# Patient Record
Sex: FEMALE | Race: OTHER | HISPANIC OR LATINO | URBAN - METROPOLITAN AREA
[De-identification: names, ages, dates, MRNs, and addresses within clinical notes are randomized per-mention and may not be internally consistent; named-entity substitution may affect disease eponyms.]

---

## 2020-12-11 ENCOUNTER — INPATIENT (INPATIENT)
Facility: HOSPITAL | Age: 60
LOS: 17 days | Discharge: HOME | End: 2020-12-29
Attending: HOSPITALIST | Admitting: HOSPITALIST
Payer: MEDICAID

## 2020-12-11 VITALS
TEMPERATURE: 99 F | SYSTOLIC BLOOD PRESSURE: 114 MMHG | DIASTOLIC BLOOD PRESSURE: 66 MMHG | OXYGEN SATURATION: 98 % | RESPIRATION RATE: 24 BRPM | HEART RATE: 77 BPM

## 2020-12-11 DIAGNOSIS — E78.5 HYPERLIPIDEMIA, UNSPECIFIED: ICD-10-CM

## 2020-12-11 DIAGNOSIS — U07.1 COVID-19: ICD-10-CM

## 2020-12-11 DIAGNOSIS — Z29.9 ENCOUNTER FOR PROPHYLACTIC MEASURES, UNSPECIFIED: ICD-10-CM

## 2020-12-11 DIAGNOSIS — D69.6 THROMBOCYTOPENIA, UNSPECIFIED: ICD-10-CM

## 2020-12-11 LAB
A1C WITH ESTIMATED AVERAGE GLUCOSE RESULT: 6.3 % — HIGH (ref 4–5.6)
ADD ON TEST-SPECIMEN IN LAB: SIGNIFICANT CHANGE UP
ADD ON TEST-SPECIMEN IN LAB: SIGNIFICANT CHANGE UP
ALBUMIN SERPL ELPH-MCNC: 4 G/DL — SIGNIFICANT CHANGE UP (ref 3.3–5)
ALP SERPL-CCNC: 67 U/L — SIGNIFICANT CHANGE UP (ref 40–120)
ALT FLD-CCNC: 53 U/L — HIGH (ref 4–33)
ANION GAP SERPL CALC-SCNC: 13 MMOL/L — SIGNIFICANT CHANGE UP (ref 7–14)
APTT BLD: 26.9 SEC — LOW (ref 27–36.3)
AST SERPL-CCNC: 84 U/L — HIGH (ref 4–32)
B PERT DNA SPEC QL NAA+PROBE: SIGNIFICANT CHANGE UP
BASE EXCESS BLDV CALC-SCNC: 1.1 MMOL/L — SIGNIFICANT CHANGE UP (ref -3–2)
BASE EXCESS BLDV CALC-SCNC: 2.7 MMOL/L — HIGH (ref -3–2)
BASOPHILS # BLD AUTO: 0.01 K/UL — SIGNIFICANT CHANGE UP (ref 0–0.2)
BASOPHILS NFR BLD AUTO: 0.3 % — SIGNIFICANT CHANGE UP (ref 0–2)
BILIRUB SERPL-MCNC: 0.2 MG/DL — SIGNIFICANT CHANGE UP (ref 0.2–1.2)
BLOOD GAS VENOUS - CREATININE: 1 MG/DL — SIGNIFICANT CHANGE UP (ref 0.5–1.3)
BLOOD GAS VENOUS - CREATININE: 1 MG/DL — SIGNIFICANT CHANGE UP (ref 0.5–1.3)
BLOOD GAS VENOUS COMPREHENSIVE RESULT: SIGNIFICANT CHANGE UP
BLOOD GAS VENOUS COMPREHENSIVE RESULT: SIGNIFICANT CHANGE UP
BUN SERPL-MCNC: 11 MG/DL — SIGNIFICANT CHANGE UP (ref 7–23)
C PNEUM DNA SPEC QL NAA+PROBE: SIGNIFICANT CHANGE UP
CALCIUM SERPL-MCNC: 8.7 MG/DL — SIGNIFICANT CHANGE UP (ref 8.4–10.5)
CHLORIDE BLDV-SCNC: 109 MMOL/L — HIGH (ref 96–108)
CHLORIDE BLDV-SCNC: 110 MMOL/L — HIGH (ref 96–108)
CHLORIDE SERPL-SCNC: 101 MMOL/L — SIGNIFICANT CHANGE UP (ref 98–107)
CO2 SERPL-SCNC: 23 MMOL/L — SIGNIFICANT CHANGE UP (ref 22–31)
CREAT SERPL-MCNC: 0.95 MG/DL — SIGNIFICANT CHANGE UP (ref 0.5–1.3)
CRP SERPL-MCNC: 31.3 MG/L — HIGH
D DIMER BLD IA.RAPID-MCNC: 502 NG/ML DDU — HIGH
EOSINOPHIL # BLD AUTO: 0.06 K/UL — SIGNIFICANT CHANGE UP (ref 0–0.5)
EOSINOPHIL NFR BLD AUTO: 1.6 % — SIGNIFICANT CHANGE UP (ref 0–6)
ESTIMATED AVERAGE GLUCOSE: 134 MG/DL — HIGH (ref 68–114)
FERRITIN SERPL-MCNC: 486 NG/ML — HIGH (ref 15–150)
FLUAV H1 2009 PAND RNA SPEC QL NAA+PROBE: SIGNIFICANT CHANGE UP
FLUAV H1 RNA SPEC QL NAA+PROBE: SIGNIFICANT CHANGE UP
FLUAV H3 RNA SPEC QL NAA+PROBE: SIGNIFICANT CHANGE UP
FLUAV SUBTYP SPEC NAA+PROBE: SIGNIFICANT CHANGE UP
FLUBV RNA SPEC QL NAA+PROBE: SIGNIFICANT CHANGE UP
GAS PNL BLDV: 135 MMOL/L — LOW (ref 136–146)
GAS PNL BLDV: 138 MMOL/L — SIGNIFICANT CHANGE UP (ref 136–146)
GLUCOSE BLDV-MCNC: 112 MG/DL — HIGH (ref 70–99)
GLUCOSE BLDV-MCNC: 90 MG/DL — SIGNIFICANT CHANGE UP (ref 70–99)
GLUCOSE SERPL-MCNC: 113 MG/DL — HIGH (ref 70–99)
HADV DNA SPEC QL NAA+PROBE: SIGNIFICANT CHANGE UP
HCO3 BLDV-SCNC: 24 MMOL/L — SIGNIFICANT CHANGE UP (ref 20–27)
HCO3 BLDV-SCNC: 26 MMOL/L — SIGNIFICANT CHANGE UP (ref 20–27)
HCOV PNL SPEC NAA+PROBE: SIGNIFICANT CHANGE UP
HCT VFR BLD CALC: 38.3 % — SIGNIFICANT CHANGE UP (ref 34.5–45)
HCT VFR BLDA CALC: 36.9 % — SIGNIFICANT CHANGE UP (ref 34.5–46.5)
HCT VFR BLDA CALC: 39.8 % — SIGNIFICANT CHANGE UP (ref 34.5–46.5)
HGB BLD CALC-MCNC: 12 G/DL — SIGNIFICANT CHANGE UP (ref 11.5–15.5)
HGB BLD CALC-MCNC: 12.9 G/DL — SIGNIFICANT CHANGE UP (ref 11.5–15.5)
HGB BLD-MCNC: 12.8 G/DL — SIGNIFICANT CHANGE UP (ref 11.5–15.5)
HMPV RNA SPEC QL NAA+PROBE: SIGNIFICANT CHANGE UP
HPIV1 RNA SPEC QL NAA+PROBE: SIGNIFICANT CHANGE UP
HPIV2 RNA SPEC QL NAA+PROBE: SIGNIFICANT CHANGE UP
HPIV3 RNA SPEC QL NAA+PROBE: SIGNIFICANT CHANGE UP
HPIV4 RNA SPEC QL NAA+PROBE: SIGNIFICANT CHANGE UP
IANC: 2.16 K/UL — SIGNIFICANT CHANGE UP (ref 1.5–8.5)
IMM GRANULOCYTES NFR BLD AUTO: 1.1 % — SIGNIFICANT CHANGE UP (ref 0–1.5)
INR BLD: 1.13 RATIO — SIGNIFICANT CHANGE UP (ref 0.88–1.17)
LACTATE BLDV-MCNC: 1.3 MMOL/L — SIGNIFICANT CHANGE UP (ref 0.5–2)
LACTATE BLDV-MCNC: 2.6 MMOL/L — HIGH (ref 0.5–2)
LDH SERPL L TO P-CCNC: 547 U/L — HIGH (ref 135–225)
LYMPHOCYTES # BLD AUTO: 1.22 K/UL — SIGNIFICANT CHANGE UP (ref 1–3.3)
LYMPHOCYTES # BLD AUTO: 32.4 % — SIGNIFICANT CHANGE UP (ref 13–44)
MCHC RBC-ENTMCNC: 28.2 PG — SIGNIFICANT CHANGE UP (ref 27–34)
MCHC RBC-ENTMCNC: 33.4 GM/DL — SIGNIFICANT CHANGE UP (ref 32–36)
MCV RBC AUTO: 84.4 FL — SIGNIFICANT CHANGE UP (ref 80–100)
MONOCYTES # BLD AUTO: 0.28 K/UL — SIGNIFICANT CHANGE UP (ref 0–0.9)
MONOCYTES NFR BLD AUTO: 7.4 % — SIGNIFICANT CHANGE UP (ref 2–14)
NEUTROPHILS # BLD AUTO: 2.16 K/UL — SIGNIFICANT CHANGE UP (ref 1.8–7.4)
NEUTROPHILS NFR BLD AUTO: 57.2 % — SIGNIFICANT CHANGE UP (ref 43–77)
NRBC # BLD: 0 /100 WBCS — SIGNIFICANT CHANGE UP
NRBC # FLD: 0 K/UL — SIGNIFICANT CHANGE UP
PCO2 BLDV: 38 MMHG — LOW (ref 41–51)
PCO2 BLDV: 42 MMHG — SIGNIFICANT CHANGE UP (ref 41–51)
PH BLDV: 7.4 — SIGNIFICANT CHANGE UP (ref 7.32–7.43)
PH BLDV: 7.46 — HIGH (ref 7.32–7.43)
PLATELET # BLD AUTO: 121 K/UL — LOW (ref 150–400)
PO2 BLDV: 28 MMHG — LOW (ref 35–40)
PO2 BLDV: 33 MMHG — LOW (ref 35–40)
POTASSIUM BLDV-SCNC: 4.1 MMOL/L — SIGNIFICANT CHANGE UP (ref 3.4–4.5)
POTASSIUM BLDV-SCNC: 4.1 MMOL/L — SIGNIFICANT CHANGE UP (ref 3.4–4.5)
POTASSIUM SERPL-MCNC: 4.2 MMOL/L — SIGNIFICANT CHANGE UP (ref 3.5–5.3)
POTASSIUM SERPL-SCNC: 4.2 MMOL/L — SIGNIFICANT CHANGE UP (ref 3.5–5.3)
PROCALCITONIN SERPL-MCNC: 0.08 NG/ML — SIGNIFICANT CHANGE UP (ref 0.02–0.1)
PROT SERPL-MCNC: 7.6 G/DL — SIGNIFICANT CHANGE UP (ref 6–8.3)
PROTHROM AB SERPL-ACNC: 12.9 SEC — SIGNIFICANT CHANGE UP (ref 9.8–13.1)
RAPID RVP RESULT: DETECTED
RBC # BLD: 4.54 M/UL — SIGNIFICANT CHANGE UP (ref 3.8–5.2)
RBC # FLD: 13.8 % — SIGNIFICANT CHANGE UP (ref 10.3–14.5)
RV+EV RNA SPEC QL NAA+PROBE: SIGNIFICANT CHANGE UP
SAO2 % BLDV: 38.9 % — LOW (ref 60–85)
SAO2 % BLDV: 60.9 % — SIGNIFICANT CHANGE UP (ref 60–85)
SARS-COV-2 RNA SPEC QL NAA+PROBE: DETECTED
SODIUM SERPL-SCNC: 137 MMOL/L — SIGNIFICANT CHANGE UP (ref 135–145)
WBC # BLD: 3.77 K/UL — LOW (ref 3.8–10.5)
WBC # FLD AUTO: 3.77 K/UL — LOW (ref 3.8–10.5)

## 2020-12-11 PROCEDURE — 99223 1ST HOSP IP/OBS HIGH 75: CPT

## 2020-12-11 PROCEDURE — 99285 EMERGENCY DEPT VISIT HI MDM: CPT

## 2020-12-11 PROCEDURE — 71045 X-RAY EXAM CHEST 1 VIEW: CPT | Mod: 26

## 2020-12-11 RX ORDER — ALBUTEROL 90 UG/1
2 AEROSOL, METERED ORAL ONCE
Refills: 0 | Status: COMPLETED | OUTPATIENT
Start: 2020-12-11 | End: 2020-12-11

## 2020-12-11 RX ORDER — ACETAMINOPHEN 500 MG
650 TABLET ORAL EVERY 4 HOURS
Refills: 0 | Status: DISCONTINUED | OUTPATIENT
Start: 2020-12-11 | End: 2020-12-14

## 2020-12-11 RX ORDER — ALBUTEROL 90 UG/1
2 AEROSOL, METERED ORAL EVERY 6 HOURS
Refills: 0 | Status: COMPLETED | OUTPATIENT
Start: 2020-12-11 | End: 2020-12-13

## 2020-12-11 RX ORDER — SODIUM CHLORIDE 9 MG/ML
1000 INJECTION INTRAMUSCULAR; INTRAVENOUS; SUBCUTANEOUS ONCE
Refills: 0 | Status: COMPLETED | OUTPATIENT
Start: 2020-12-11 | End: 2020-12-11

## 2020-12-11 RX ORDER — DEXAMETHASONE 0.5 MG/5ML
6 ELIXIR ORAL ONCE
Refills: 0 | Status: COMPLETED | OUTPATIENT
Start: 2020-12-11 | End: 2020-12-11

## 2020-12-11 RX ORDER — ENOXAPARIN SODIUM 100 MG/ML
40 INJECTION SUBCUTANEOUS AT BEDTIME
Refills: 0 | Status: DISCONTINUED | OUTPATIENT
Start: 2020-12-11 | End: 2020-12-17

## 2020-12-11 RX ADMIN — ALBUTEROL 2 PUFF(S): 90 AEROSOL, METERED ORAL at 11:19

## 2020-12-11 RX ADMIN — SODIUM CHLORIDE 1000 MILLILITER(S): 9 INJECTION INTRAMUSCULAR; INTRAVENOUS; SUBCUTANEOUS at 11:19

## 2020-12-11 RX ADMIN — Medication 100 MILLIGRAM(S): at 11:18

## 2020-12-11 RX ADMIN — Medication 100 MILLIGRAM(S): at 21:31

## 2020-12-11 RX ADMIN — ALBUTEROL 2 PUFF(S): 90 AEROSOL, METERED ORAL at 21:31

## 2020-12-11 RX ADMIN — SODIUM CHLORIDE 1000 MILLILITER(S): 9 INJECTION INTRAMUSCULAR; INTRAVENOUS; SUBCUTANEOUS at 14:11

## 2020-12-11 RX ADMIN — Medication 6 MILLIGRAM(S): at 13:15

## 2020-12-11 RX ADMIN — Medication 100 MILLIGRAM(S): at 16:12

## 2020-12-11 RX ADMIN — ENOXAPARIN SODIUM 40 MILLIGRAM(S): 100 INJECTION SUBCUTANEOUS at 21:42

## 2020-12-11 RX ADMIN — ALBUTEROL 2 PUFF(S): 90 AEROSOL, METERED ORAL at 16:45

## 2020-12-11 NOTE — DISCHARGE NOTE PROVIDER - HOSPITAL COURSE
61 yo obese F with HLD presents with new shortness of breath in setting of recent COVID diagnosis as OP. Patient states that hse has been going to [employer], last 12/1/20, and many of the congregates do not wear masks the entire service, states started feeling ill 12/3/20.  Started with cough/chills/general maliase and weakness.  Had OP COVID testing on 12/7,  whom she lives with also positive, reports she believes she infected him; no recent travel, does not work out side the home,  Starting on 12/10 started having hacking cough that was causing worsening sob; reports urgent care prescribed 4 day of an abx but she doesn't recalll which.  Has also diarrhea usually after eating, mostly taking liquids, also some nausea but no vomiting; no CP.  No prior lung disease, never smoker.      In ED AVSS with RA sat wnl however with tachypnea and labs normal for mild transaminitis (84/53) and thrombocytopenia (121), d-dimer 503, pH 7.46/38 LA 2.6.   CXR with hazy in vague increased markings and wispy hazy ground-glass opacities suggested in peripheral left midlung and peripheral right lower lung suspicious for evolving multifocal infectious      While admitted was oxygen was monitored. Received single dose of Decadron but did not require further steroids.      Patient remained stable and on .... case was discussed with  .... and patient was cleared for discharge. Reviewed discharge medications with patient; All new medications requiring new prescription sent to pharmacy of patients choice. Reviewed need for prescription for previous home medicaitons and new prescriptions sent if requested. Patient in agreement and understands.   61 yo obese F with HLD presents with new shortness of breath in setting of recent COVID diagnosis as OP. Patient states that hse has been going to [employer], last 12/1/20, and many of the congregates do not wear masks the entire service, states started feeling ill 12/3/20.  Started with cough/chills/general maliase and weakness.  Had OP COVID testing on 12/7,  whom she lives with also positive, reports she believes she infected him; no recent travel, does not work out side the home,  Starting on 12/10 started having hacking cough that was causing worsening sob; reports urgent care prescribed 4 day of an abx but she doesn't recalll which.  Has also diarrhea usually after eating, mostly taking liquids, also some nausea but no vomiting; no CP.  No prior lung disease, never smoker.      In ED AVSS with RA sat wnl however with tachypnea and labs normal for mild transaminitis (84/53) and thrombocytopenia (121), d-dimer 503, pH 7.46/38 LA 2.6.   CXR with hazy in vague increased markings and wispy hazy ground-glass opacities suggested in peripheral left midlung and peripheral right lower lung suspicious for evolving multifocal infectious      While admitted was oxygen was monitored and supplemental oxygen was given as needed via nasal cannula. Received  Remdesivir 12/14-12/18, Decadron 6mg12/14-12/23.     It has been determined that the patient no longer needs hospitalization and can recover while remaining in self-quarantine at home.  Patient should follow the prevention steps below until a healthcare provider or local or state health department says to can return to normal activities.  Patients with confirmed COVID-19 should remain under home isolation precautions for 14 days since the positive COVID-19 test and until the risk of secondary transmission to others is thought to be low. The decision to discontinue home isolation precautions should be made on a case-by-case basis, in consultation with healthcare providers and state and local health departments. [place] Department of [facility] can be reached at [phone] for further information about COVID-19.      Patient remained stable and on .... case was discussed with Dr. Pastrana... and patient was cleared for discharge. Reviewed discharge medications with patient; All new medications requiring new prescription sent to pharmacy of patients choice. Reviewed need for prescription for previous home medicaitons and new prescriptions sent if requested. Patient in agreement and understands.    59yo obese F with HLD presents with new shortness of breath in setting of recent COVID diagnosis as OP. Patient states that hse has been going to [employer], last 12/1/20, and many of the congregates do not wear masks the entire service, states started feeling ill 12/3/20.  Started with cough/chills/general maliase and weakness.  Had OP COVID testing on 12/7,  whom she lives with also positive, reports she believes she infected him; no recent travel, does not work out side the home,  Starting on 12/10 started having hacking cough that was causing worsening sob; reports urgent care prescribed 4 day of an abx but she doesn't recalll which.  Has also diarrhea usually after eating, mostly taking liquids, also some nausea but no vomiting; no CP.  No prior lung disease, never smoker.      In ED AVSS with RA sat wnl however with tachypnea and labs normal for mild transaminitis (84/53) and thrombocytopenia (121), d-dimer 503, pH 7.46/38 LA 2.6.   CXR with hazy in vague increased markings and wispy hazy ground-glass opacities suggested in peripheral left midlung and peripheral right lower lung suspicious for evolving multifocal infectious      While admitted was oxygen was monitored and supplemental oxygen was given as needed via nasal cannula. Received  Remdesivir 12/14-12/18, Decadron 6mg12/14-12/23.     It has been determined that the patient no longer needs hospitalization and can recover while remaining in self-quarantine at home.  Patient should follow the prevention steps below until a healthcare provider or local or state health department says to can return to normal activities.  Patients with confirmed COVID-19 should remain under home isolation precautions for 14 days since the positive COVID-19 test and until the risk of secondary transmission to others is thought to be low. The decision to discontinue home isolation precautions should be made on a case-by-case basis, in consultation with healthcare providers and state and local health departments. [place] Department [facility] can be reached at [phone] for further information about COVID-19.      Patient remained stable and on .... case was discussed with Dr. Pastrana... and patient was cleared for discharge. Reviewed discharge medications with patient; All new medications requiring new prescription sent to pharmacy of patients choice. Reviewed need for prescription for previous home medicaitons and new prescriptions sent if requested. Patient in agreement and understands.    61yo obese F with HLD presents with new shortness of breath in setting of recent COVID diagnosis as OP. Patient states that hse has been going to [employer], last 12/1/20, and many of the congregates do not wear masks the entire service, states started feeling ill 12/3/20.  Started with cough/chills/general maliase and weakness.  Had OP COVID testing on 12/7,  whom she lives with also positive, reports she believes she infected him; no recent travel, does not work out side the home,  Starting on 12/10 started having hacking cough that was causing worsening sob; reports urgent care prescribed 4 day of an abx but she doesn't recalll which.  Has also diarrhea usually after eating, mostly taking liquids, also some nausea but no vomiting; no CP.  No prior lung disease, never smoker.      In ED AVSS with RA sat wnl however with tachypnea and labs normal for mild transaminitis (84/53) and thrombocytopenia (121), d-dimer 503, pH 7.46/38 LA 2.6.   CXR with hazy in vague increased markings and wispy hazy ground-glass opacities suggested in peripheral left midlung and peripheral right lower lung suspicious for evolving multifocal infectious      While admitted was oxygen was monitored and supplemental oxygen was given as needed via nasal cannula. Received  Remdesivir 12/14-12/18, Decadron 6mg12/14-12/23.     It has been determined that the patient no longer needs hospitalization and can recover while remaining in self-quarantine at home.  Patient should follow the prevention steps below until a healthcare provider or local or state health department says to can return to normal activities.  Patients with confirmed COVID-19 should remain under home isolation precautions for 14 days since the positive COVID-19 test and until the risk of secondary transmission to others is thought to be low. The decision to discontinue home isolation precautions should be made on a case-by-case basis, in consultation with healthcare providers and state and local health departments. [place] Department [facility] can be reached at [phone] for further information about COVID-19.      Patient remained stable and on 12/29 case was discussed with Dr. Melara  and patient was cleared for discharge. Reviewed discharge medications with patient; All new medications requiring new prescription sent to pharmacy of patients choice. Reviewed need for prescription for previous home medicaitons and new prescriptions sent if requested. Patient in agreement and understands.    59yo obese F with HLD presents with new shortness of breath in setting of recent COVID diagnosis as OP. Patient states that hse has been going to [employer], last 12/1/20, and many of the congregates do not wear masks the entire service, states started feeling ill 12/3/20.  Started with cough/chills/general maliase and weakness.  Had OP COVID testing on 12/7,  whom she lives with also positive, reports she believes she infected him; no recent travel, does not work out side the home,  Starting on 12/10 started having hacking cough that was causing worsening sob; reports urgent care prescribed 4 day of an abx but she doesn't recalll which.  Has also diarrhea usually after eating, mostly taking liquids, also some nausea but no vomiting; no CP.  No prior lung disease, never smoker.      In ED AVSS with RA sat wnl however with tachypnea and labs normal for mild transaminitis (84/53) and thrombocytopenia (121), d-dimer 503, pH 7.46/38 LA 2.6.   CXR with hazy in vague increased markings and wispy hazy ground-glass opacities suggested in peripheral left midlung and peripheral right lower lung suspicious for evolving multifocal infectious      While admitted was oxygen was monitored and supplemental oxygen was given as needed via nasal cannula. Received  Remdesivir 12/14-12/18, Decadron 6mg12/14-12/23.     It has been determined that the patient no longer needs hospitalization and can recover while remaining in self-quarantine at home.  Patient should follow the prevention steps below until a healthcare provider or local or state health department says to can return to normal activities.  Patients with confirmed COVID-19 should remain under home isolation precautions for 14 days since the positive COVID-19 test and until the risk of secondary transmission to others is thought to be low. The decision to discontinue home isolation precautions should be made on a case-by-case basis, in consultation with healthcare providers and state and local health departments. [place] Department [facility] can be reached at [phone] for further information about COVID-19.      Patient remained stable and on 12/29 case was discussed with Dr. Melara  and patient was cleared for discharge. Reviewed discharge medications with patient; All new medications requiring new prescription sent to pharmacy of patients choice. Reviewed need for prescription for previous home medicaitons and new prescriptions sent if requested. Patient in agreement and understands.      59 yo woman w/ Hx obesity, HLD admitted for monitoring of respiratory status in the setting of COVID PNA. Pt symptomatic but remains with adequate oxygenation on room air. Noted positive blood cx in 1 of 2 sets s/p dose of Vancomycin but likely contaminant since noted to be coag negative staph. D/C 40 minutes.      Problem/Plan - 1:  ·  Problem: Orthostatic hypotension.  Plan: - Orthostatic reading improving  -patient denies dizzines or HA today.  - on Midodrine 5mg TID x 2 weeks  - Meclizine PRN dizziness.   -TTE WNL.      Problem/Plan - 2:  ·  Problem: Chest pain.  Plan: - 12/25: Reports 1 episode of chest pain overnight, midsternal, nonradiating.   - Currently denies any chest pain or shortness of breath, nausea/vomiting.   - Admits to feeling intermittently anxious/overwhelmed due to Covid.   - RULED OUT ACS, symptoms possibly anxiety driven.   - 12/25: TROPONIN negative, EKG with no acute findings, no ST changes.   - TTE WNL.      Problem/Plan - 3:  ·  Problem: COVID-19 virus infection.  Plan: - COVID PCR + on 12/7 (as OP), 12/11 ([facility]). CXR w hazy opacities.  - Pt saturating 94-98% on room air. During ambulation 96%.  - Completed Remdesivir day 5/5 and Dexamethasone day 9/10   - Monitor inflammatory markers, LFTs and renal function closely, trending down   - c/w supportive care with cough suppressants and Albuterol MDIs.  - PRN Tylenol for body aches and headache.  - Incentive spirometry, Chest PT as tolerated.      Problem/Plan - 4:  ·  Problem: Positive blood culture.  Plan: - Blood cultures: 1 out of 2 sets noted to have Staph hominis. Suspect contaminant.   - Pt remains hemodynamically stable and with normal WBC.   - Repeat blood cx NGTD. Defer further abx for now, ID f/u appreciated.      Problem/Plan - 5:  ·  Problem: Hyperlipidemia, unspecified hyperlipidemia type.  Plan: - Not on meds, outpatient f/u.      Problem/Plan - 6:  Problem: Thrombocytopenia. Plan: - Likely in setting of COVID infection on admission. Spontaneously resolved.     Problem/Plan - 7:  ·  Problem: Prophylactic measure.  Plan: - IMPROVE Score 3 - on Lovenox SQ for DVT prevention.   - D-dimer was worsening therefore increased Lovenox 40mg SQ to BID  - Will need extended DVT ppx on DC ASA 81mg  ( x30 days).   - Dispo PENDING COURSE, Weaned to room air, O2 sats stable during ambulation.  spoke to  Lokesh Orourke and aware of discharge planning.      Discharge Medication reconciliation and follow up reviewed with Medical Attending and confirmed before discharge on 12/29

## 2020-12-11 NOTE — H&P ADULT - PROBLEM SELECTOR PLAN 4
IMPROVE Score 3 - Lovenox for DVT prevention IMPROVE Score 3 - Lovenox for DVT prevention, pending BMI calculation may need to change to BID dosing

## 2020-12-11 NOTE — H&P ADULT - NSHPREVIEWOFSYSTEMS_GEN_ALL_CORE
CONSTITUTIONAL: + fever/chills, + fatigue   EYES: No eye pain, visual disturbances, or discharge  ENMT:  No difficulty hearing, tinnitus  NECK: No pain or stiffness  BREASTS: No pain, masses, or nipple discharge  CARDIOVASCULAR: No chest pain, palpitations, dizziness, or leg swelling  GASTROINTESTINAL: no abdominal pain, +diarrhea, + nausea   GENITOURINARY: No dysuria, frequency, hematuria, or incontinence  NEUROLOGICAL: No headaches, memory loss, loss of strength, numbness, or tremors  SKIN: No itching, burning, rashes, or lesions   LYMPH NODES: No enlarged glands  ENDOCRINE: No heat or cold intolerance; No hair loss  MUSCULOSKELETAL: No muscle or back pain  PSYCHIATRIC: No depression, anxiety, mood swings, or difficulty sleeping  HEME/LYMPH: No easy bruising, or bleeding gums  ALLERGY AND IMMUNOLOGIC: No hives or eczema

## 2020-12-11 NOTE — ED PROVIDER NOTE - NS ED ROS FT
GENERAL: fever  EYES: no change in vision  HEENT: no trouble swallowing or speaking  CARDIAC: no chest pain or palpitations  PULMONARY: sob, cough  GI: diarrhea, no abd pain, nausea, vomiting   : No changes in urination  SKIN: no rashes  NEURO: no headache, numbness, or weakness  MSK: No joint pain

## 2020-12-11 NOTE — DISCHARGE NOTE PROVIDER - CARE PROVIDER_API CALL
PCP,   Phone: (   )    -  Fax: (   )    -  Follow Up Time:    Neri Rousseau  Phone: [phone]  Fax: (   )    -  Follow Up Time: 1 week

## 2020-12-11 NOTE — DISCHARGE NOTE PROVIDER - NSDCFUADDAPPT_GEN_ALL_CORE_FT
Follow up with your primary care physician for further monitoring in 1-2 weeks. Please call to arrange appointment.

## 2020-12-11 NOTE — H&P ADULT - NSHPPHYSICALEXAM_GEN_ALL_CORE
T(C): 37.2 (12-11-20 @ 10:19), Max: 37.2 (12-11-20 @ 10:19)  HR: 85 (12-11-20 @ 13:16) (77 - 86)  BP: 105/68 (12-11-20 @ 13:16) (105/68 - 114/66)  RR: 26 (12-11-20 @ 13:16) (24 - 30)  SpO2: 100% (12-11-20 @ 13:16) (98% - 100%)    PHYSICAL EXAM:  GENERAL: NAD, obese, sat on RA was 96%  HEAD:  Atraumatic, Normocephalic  CHEST/LUNG: Clear to auscultation bilaterally; no wheeze  HEART: Regular rate and rhythm; no murmurs, rubs, or gallops  ABDOMEN: Soft, Nontender, Nondistended; Bowel sounds present  EXTREMITIES:  warm and well perfused, no clubbing, cyanosis, or edema  PSYCH: AAOx3  NEUROLOGY: non-focal  SKIN: no rashes or lesions

## 2020-12-11 NOTE — ED PROVIDER NOTE - PHYSICAL EXAMINATION
GEN: coughing, appears uncomfortable   HEENT: NCAT, MMM, normal conjunctiva, perrl  CHEST/LUNGS: tachypneic, b/l expiratory wheeze   CARDIAC: Non-tachycardic, s1s2, normal perfusion, no peripheral edema  ABDOMEN: Soft, NTND, No rebound/guarding  MSK: No joint tenderness, no gross deformity of extremities  SKIN: No rashes, no petechiae, no vesicles  NEURO: CN grossly intact, normal coordination, no focal motor or sensory deficits  PSYCH: Alert, appropriate, cooperative

## 2020-12-11 NOTE — DISCHARGE NOTE PROVIDER - NSDCCPCAREPLAN_GEN_ALL_CORE_FT
PRINCIPAL DISCHARGE DIAGNOSIS  Diagnosis: COVID-19 virus infection  Assessment and Plan of Treatment: You have been diagnosed with the COVID-19 virus during your hospital stay. You must self quarantine to complete a 14 day time period and until you no longer have fever or symptoms for 3 consecutive days.  Monitor your temperature daily to not any changes and increases.  Please remain in self quaratine while you continue to recover at home.  For questions regarding COVID-19 the  [place] [facility] of [facility] can be reached at [phone] for further information about COVID-19.   1. You should restrict activities outside your home, except for getting medical care.  2. Do not go to work, school, or public areas.  3. Avoid using public transportation, ride-sharing, or taxis.  4. Separate yourself from other people and animals in your home; Use a seperate bathroom if possible.   5. Call ahead before visiting your doctor and notify the staff that you had or may have COVID 19.   6. Wear a facemask when you are around other people (e.g., sharing a room or vehicle) or pets and before you enter a healthcare provider’s office. If you are unable to wear a mask then the people around you should wear one.   8. Clean your hands often.  Soap and water are the best option if hands are visibly dirty. Avoid touching your eyes, nose, and mouth with unwashed hands.  9. Avoid sharing personal household items.  10. Clean all “high-touch” surfaces everyday to help limit spread of the virus  11. Monitor your symptoms and Seek prompt medical attention if your illness is worsening. If you have a medical emergency and need to call 911, notify the dispatch personnel.       PRINCIPAL DISCHARGE DIAGNOSIS  Diagnosis: COVID-19 virus infection  Assessment and Plan of Treatment: You have been diagnosed with the COVID-19 virus during your hospital stay. You must self quarantine to complete a 14 day time period and until you no longer have fever or symptoms for 3 consecutive days.  Monitor your temperature daily to not any changes and increases.  Please remain in self quaratine while you continue to recover at home.  For questions regarding COVID-19 the  [place] [facility] of [facility] can be reached at [phone] for further information about COVID-19.   1. You should restrict activities outside your home, except for getting medical care.  2. Do not go to work, school, or public areas.  3. Avoid using public transportation, ride-sharing, or taxis.  4. Separate yourself from other people and animals in your home; Use a seperate bathroom if possible.   5. Call ahead before visiting your doctor and notify the staff that you had or may have COVID 19.   6. Wear a facemask when you are around other people (e.g., sharing a room or vehicle) or pets and before you enter a healthcare provider’s office. If you are unable to wear a mask then the people around you should wear one.   8. Clean your hands often.  Soap and water are the best option if hands are visibly dirty. Avoid touching your eyes, nose, and mouth with unwashed hands.  9. Avoid sharing personal household items.  10. Clean all “high-touch” surfaces everyday to help limit spread of the virus  11. Monitor your symptoms and Seek prompt medical attention if your illness is worsening. If you have a medical emergency and need to call 911, notify the dispatch personnel.      SECONDARY DISCHARGE DIAGNOSES  Diagnosis: Orthostatic hypotension  Assessment and Plan of Treatment: Orthostatic hypotension trial of midodrine and encourage fluids  by mouth  follow up with primary care doctor within 1 week for follow up    Diagnosis: Chest pain  Assessment and Plan of Treatment: Chest pain likely secondary to cough, cardiology workup ??? ; TTE----------------------------------*****    Diagnosis: Transaminitis  Assessment and Plan of Treatment: Transaminitis Liver function tests at discharge:   Follow up with your primary care doctor within 1 week for repeat blood work    Diagnosis: Thrombocytopenia  Assessment and Plan of Treatment: Thrombocytopenia  Platelet count at discharge:   Follow up with your primary care doctor within 1 week for repeat blood work    Diagnosis: Hyperlipidemia, unspecified hyperlipidemia type  Assessment and Plan of Treatment: Hyperlipidemia, unspecified hyperlipidemia type Continue prescribed medications to control your cholesterol levels and a DASH (Low fat/salt) diet. Follow up with your primary care provider upon discharge for further management and monitoring of cholesterol levels. exercise daily and continue current medications. Follow up with primary care physician and cardiologist for management.     PRINCIPAL DISCHARGE DIAGNOSIS  Diagnosis: COVID-19 virus infection  Assessment and Plan of Treatment: You have been diagnosed with the COVID-19 virus during your hospital stay. You must self quarantine to complete a 14 day time period and until you no longer have fever or symptoms for 3 consecutive days.  Monitor your temperature daily to not any changes and increases.  Please remain in self quaratine while you continue to recover at home.  For questions regarding COVID-19 the  [place] [facility] can be reached at [phone] for further information about COVID-19.   1. You should restrict activities outside your home, except for getting medical care.  2. Do not go to work, school, or public areas.  3. Avoid using public transportation, ride-sharing, or taxis.  4. Separate yourself from other people and animals in your home; Use a seperate bathroom if possible.   5. Call ahead before visiting your doctor and notify the staff that you had or may have COVID 19.   6. Wear a facemask when you are around other people (e.g., sharing a room or vehicle) or pets and before you enter a healthcare provider’s office. If you are unable to wear a mask then the people around you should wear one.   8. Clean your hands often.  Soap and water are the best option if hands are visibly dirty. Avoid touching your eyes, nose, and mouth with unwashed hands.  9. Avoid sharing personal household items.  10. Clean all “high-touch” surfaces everyday to help limit spread of the virus  11. Monitor your symptoms and Seek prompt medical attention if your illness is worsening. If you have a medical emergency and need to call 911, notify the dispatch personnel.      SECONDARY DISCHARGE DIAGNOSES  Diagnosis: Orthostatic hypotension  Assessment and Plan of Treatment: Orthostatic hypotension trial of midodrine and encourage fluids  by mouth  follow up with primary care doctor within 1 week for follow up    Diagnosis: Chest pain  Assessment and Plan of Treatment: Chest pain likely secondary to cough, TTE normal    Diagnosis: Transaminitis  Assessment and Plan of Treatment: Transaminitis resolved follow up with primary care doctor routinely    Diagnosis: Thrombocytopenia  Assessment and Plan of Treatment: Thrombocytopenia likely 2/2 medications   Follow up with your primary care doctor within 1 week for repeat blood work    Diagnosis: Hyperlipidemia, unspecified hyperlipidemia type  Assessment and Plan of Treatment: Hyperlipidemia, unspecified hyperlipidemia type Continue prescribed medications to control your cholesterol levels and a DASH (Low fat/salt) diet. Follow up with your primary care provider upon discharge for further management and monitoring of cholesterol levels. exercise daily and continue current medications. Follow up with primary care physician and cardiologist for management.

## 2020-12-11 NOTE — DISCHARGE NOTE PROVIDER - PROVIDER TOKENS
FREE:[LAST:[PCP],PHONE:[(   )    -],FAX:[(   )    -]] FREE:[LAST:[Dr Butt],FIRST:[Neri],PHONE:[phone]],FAX:[(   )    -],FOLLOWUP:[1 week]]

## 2020-12-11 NOTE — DISCHARGE NOTE PROVIDER - NSDCMRMEDTOKEN_GEN_ALL_CORE_FT
acetaminophen 325 mg oral tablet: 2 tab(s) orally every 6 hours, As needed, Temp greater or equal to 38C (100.4F), Mild Pain (1 - 3), Moderate Pain (4 - 6)  albuterol 90 mcg/inh inhalation aerosol: 2 puff(s) inhaled every 6 hours, As needed, Shortness of Breath and/or Wheezing  guaifenesin-dextromethorphan 100 mg-10 mg/5 mL oral liquid: 10 milliliter(s) orally every 4 hours, As needed, Cough   acetaminophen 325 mg oral tablet: 2 tab(s) orally every 6 hours, As needed, Temp greater or equal to 38C (100.4F), Mild Pain (1 - 3), Moderate Pain (4 - 6)  albuterol 90 mcg/inh inhalation aerosol: 2 puff(s) inhaled every 6 hours, As needed, Shortness of Breath and/or Wheezing  guaifenesin-dextromethorphan 100 mg-10 mg/5 mL oral liquid: 10 milliliter(s) orally every 4 hours, As needed, Cough  melatonin 3 mg oral tablet: 1 tab(s) orally once a day (at bedtime), As needed, Insomnia  midodrine 5 mg oral tablet: 1 tab(s) orally 3 times a day  senna oral tablet: 2 tab(s) orally once a day (at bedtime)   acetaminophen 325 mg oral tablet: 2 tab(s) orally every 6 hours, As needed, Temp greater or equal to 38C (100.4F), Mild Pain (1 - 3), Moderate Pain (4 - 6)  albuterol 90 mcg/inh inhalation aerosol: 2 puff(s) inhaled every 6 hours, As needed, Shortness of Breath and/or Wheezing  guaifenesin-dextromethorphan 100 mg-10 mg/5 mL oral liquid: 10 milliliter(s) orally every 4 hours, As needed, Cough  melatonin 3 mg oral tablet: 1 tab(s) orally once a day (at bedtime), As needed, Insomnia  midodrine 5 mg oral tablet: 1 tab(s) orally 3 times a day  senna oral tablet: 2 tab(s) orally once a day (at bedtime)  Xarelto 10 mg oral tablet: 1 tab(s) orally once a day post discharge dvt ppx for 30 days

## 2020-12-11 NOTE — H&P ADULT - NSHPLABSRESULTS_GEN_ALL_CORE
12.8   3.77  )-----------( 121      ( 11 Dec 2020 11:50 )             38.3     12-11    137  |  101  |  11  ----------------------------<  113<H>  4.2   |  23  |  0.95    Ca    8.7      11 Dec 2020 11:50    TPro  7.6  /  Alb  4.0  /  TBili  0.2  /  DBili  x   /  AST  84<H>  /  ALT  53<H>  /  AlkPhos  67  12-11    D-Dimer 502  CRP 31.3  FErritin 482  Procalcitonin 0.08  Lactate 2.6 12.8   3.77  )-----------( 121      ( 11 Dec 2020 11:50 )             38.3     12-11    137  |  101  |  11  ----------------------------<  113<H>  4.2   |  23  |  0.95    Ca    8.7      11 Dec 2020 11:50    TPro  7.6  /  Alb  4.0  /  TBili  0.2  /  DBili  x   /  AST  84<H>  /  ALT  53<H>  /  AlkPhos  67  12-11    D-Dimer 502  CRP 31.3  Ferritin 482  Procalcitonin 0.08  Lactate 2.6    CXR personally reviewed   Vague increased markings and wispy hazy ground-glass opacities suggested in peripheral left midlung and peripheral right lower lung suspicious for evolving multifocal infectious process including from potential atypical viral etiologies, follow-up suggested otherwise chest CT would be helpful for more definitive evaluation. No pleural effusions or pneumothorax.  Heart size and mediastinal width inaccurately assessed on the projection.  Trachea midline. Unremarkable osseous structures.

## 2020-12-11 NOTE — ED ADULT NURSE NOTE - OBJECTIVE STATEMENT
pt with viral s/s since 12/3 was dx with COVID on 12/7 pt reports cough has been getting worse with SOB today. upon assessment pt has dry cough, tachypnea with fine wheezing. pt normoxic on room air palced on 2 lpm 02 via NC for comfort. denies chest pain but does report 2-3 days of diarrhea, limited PO intake however is able to drink water. Medicated as ordered, IV 20 g L forearm. labs sent pulse ox in place. kept on AB precautions

## 2020-12-11 NOTE — ED PROVIDER NOTE - OBJECTIVE STATEMENT
60F with no significant pmh presenting with dry cough, sob, diarrhea x1 week with +COVID test outpatient 12/7. Sick contact with spouse who is also COVID+. States coughing incessant, persistent and worsening causing her to feel short of breath. Denies chest pain. Endorses intermittent high fevers with last fever Tmax 103 yesterday with no fever today. Denies chills, cp, nausea, vomiting, dysuria

## 2020-12-11 NOTE — H&P ADULT - HISTORY OF PRESENT ILLNESS
60 y.o female pmh HLD presents with shortness of breath. Patient states that hse has been going to [employer] and many of the congregates do not wear masks. Last service was  12/1. She states for last week has been having cough, sob and diarrhea with general malaise. On 12/7 she was tested and found to have COVID as an outpatient. She took several days of azithromycin as prescribed without improvement. Today was having worsening cough which caused her shortness to get worse. Denies chest pain, abd pain, 59 yo obese F with HLD presents with shortness of breath. Patient states that hse has been going to [employer], last 12/1/20, and many of the congregates do not wear masks the entire service, states started feeling ill 12/3/20.  Started with cough/chills/general maliase and weakness.  Had OP COVID testing on 12/7,  whom she lives with also positive, reports she believes she infected him; no recent travel, does not work out side the home,  Starting on 12/10 started having hacking cough that was causing worsening sob; reports urgent care prescribed 4 day of an abx but she doesn't recalll which.  Has also diarrhea usually after eating, mostly taking liquids, also some nausea but no vomiting; no CP.  No prior lung disease, never smoker.      In ED AVSS with RA sat wnl however with tachypnea and labs normal for mild transaminitis (84/53) and thrombocytopenia (121), d-dimer 503, pH 7.46/38 LA 2.6.   CXR with hazy in vague increased markings and wispy hazy ground-glass opacities suggested in peripheral left midlung and peripheral right lower lung suspicious for evolving multifocal infectious 59 yo obese F with HLD presents with new shortness of breath in setting of recent COVID diagnosis as OP. Patient states that hse has been going to [employer], last 12/1/20, and many of the congregates do not wear masks the entire service, states started feeling ill 12/3/20.  Started with cough/chills/general maliase and weakness.  Had OP COVID testing on 12/7,  whom she lives with also positive, reports she believes she infected him; no recent travel, does not work out side the home,  Starting on 12/10 started having hacking cough that was causing worsening sob; reports urgent care prescribed 4 day of an abx but she doesn't recalll which.  Has also diarrhea usually after eating, mostly taking liquids, also some nausea but no vomiting; no CP.  No prior lung disease, never smoker.      In ED AVSS with RA sat wnl however with tachypnea and labs normal for mild transaminitis (84/53) and thrombocytopenia (121), d-dimer 503, pH 7.46/38 LA 2.6.   CXR with hazy in vague increased markings and wispy hazy ground-glass opacities suggested in peripheral left midlung and peripheral right lower lung suspicious for evolving multifocal infectious

## 2020-12-11 NOTE — ED PROVIDER NOTE - ATTENDING CONTRIBUTION TO CARE
60F with no significant pmh presenting with dry cough, sob, diarrhea x1 week with +COVID test outpatient 12/7. Sick contact with spouse who is also COVID+. States coughing incessant, persistent and worsening causing her to feel short of breath. Denies chest pain. Endorses intermittent high fevers with last fever Tmax 103 yesterday with no fever today. Denies chills, cp, nausea, vomiting, dysuria    GEN: coughing, appears uncomfortable   HEENT: NCAT, MMM, normal conjunctiva, perrl  CHEST/LUNGS: tachypneic, b/l expiratory wheeze   CARDIAC: Non-tachycardic, s1s2, normal perfusion, no peripheral edema  ABDOMEN: Soft, NTND, No rebound/guarding  MSK: No joint tenderness, no gross deformity of extremities  SKIN: No rashes, no petechiae, no vesicles  NEURO: CN grossly intact, normal coordination, no focal motor or sensory deficits  PSYCH: Alert, appropriate, cooperative     Patient presenting +COVID with severe cough and sob. Will treat symptomatically, obtain covid screening labs and cxr to r/o superimposed bacterial infection.

## 2020-12-11 NOTE — H&P ADULT - NSHPSOCIALHISTORY_GEN_ALL_CORE
no smoking, etoh, recreational drug use , lives with , no assitive devices to ambulate   no smoking, etoh, recreational drug use

## 2020-12-11 NOTE — ED ADULT TRIAGE NOTE - CHIEF COMPLAINT QUOTE
pt started with dry cough /Fever, since Dec 3, 2020 was tested + COVID 19 on 12/7 /20 now Prod. cough and 103. F yest. took Tylenol yest.   pt denies PM Hx.  pt stated her spouse + COVID 19 also.

## 2020-12-11 NOTE — H&P ADULT - PROBLEM SELECTOR PLAN 3
- unknown baseline  Trend for now  May be due to infection Plt 121 likely in setting of COVID infection   - trend

## 2020-12-11 NOTE — H&P ADULT - ASSESSMENT
60 y.o female pmh HLD presents with shortness of breath that is COVID + admitted for further treatment. 59 yo obese F with HLD presents with SOB and coughing in setting of recent COVID diagnosis (OP 12/7, confirmed here 12/11) with COVID PNA admitting for monitoring given concern for tachypnea.

## 2020-12-11 NOTE — DISCHARGE NOTE PROVIDER - NSDCFUADDINST_GEN_ALL_CORE_FT
You were found to be positive COVID 19 virus. Please follow full instructions listed in your paperwork. Please self quarantine at home for 14 days from discharge and stay 6 feet away minimum from other individuals or animals. Do not go to work, school, public areas, or use public transportaion. Restrict outside activity except for  medical care. Please call ahead if you have an appointment with your doctor to inform them of your COVID positive status. Always wear a facemask at home. Cover your sneezes and coughs, and wash hands with soap and water for at least 20 seconds frequently. Avoid sharing personal household items. Clean all surfaces where you contact frequently such as coutners and doorknobs frequently.    If you have any worsening breathing, faster breathing or trouble with breathing call 911 immediately or your healthcare provider ahead of time and wear facemask before being seen by medical personel.   Take tylenol for your fevers. Please follow state and local rules and regulations regarding coming off of isolation.

## 2020-12-11 NOTE — ED PROVIDER NOTE - CLINICAL SUMMARY MEDICAL DECISION MAKING FREE TEXT BOX
Patient presenting +COVID with severe cough and sob. Will treat symptomatically, obtain covid screening labs and cxr to r/o superimposed bacterial infection.

## 2020-12-11 NOTE — H&P ADULT - PROBLEM SELECTOR PLAN 1
Admit to medicine  Covid + on 12/7   Completed ~4 days of Azithromycin - would not continue  Currently 96-98% on RA while talking - monitor O2 and use supplemental as needed  S/P Decadron 6mg IV x1 in ED - for now would not continue.  No indication for Remdesivir at this time  D-Dimer elevated but low suspicion for PE  Trend inflammator markers q72 hours - next on 12/14 COVID PCR + on 12/7 (as OP) and 12/11 ([facility]).  CXR with hazy opacities.  Resp status thus far stable.  In ED given decadron 6 mg x1.  - monitor resp status and O2, will need amb sat, O2 prn  - hold off on remdesivir and steroids given sats acceptable on RA  - monitor inflammatory markers q72h, next due 12/14 COVID PCR + on 12/7 (as OP) and 12/11 ([facility]).  CXR with hazy opacities.  Resp status thus far stable.  In ED given decadron 6 mg x1.  - monitor resp status and O2, will need amb sat, O2 prn  - hold off on remdesivir and steroids given sats acceptable on RA  - monitor inflammatory markers q72h, next due 12/14  - c/w supportive care with cough suppressants and albuterol MDIs

## 2020-12-12 LAB
-  COAGULASE NEGATIVE STAPHYLOCOCCUS: SIGNIFICANT CHANGE UP
ALBUMIN SERPL ELPH-MCNC: 3.7 G/DL — SIGNIFICANT CHANGE UP (ref 3.3–5)
ALP SERPL-CCNC: 66 U/L — SIGNIFICANT CHANGE UP (ref 40–120)
ALT FLD-CCNC: 57 U/L — HIGH (ref 4–33)
ANION GAP SERPL CALC-SCNC: 14 MMOL/L — SIGNIFICANT CHANGE UP (ref 7–14)
AST SERPL-CCNC: 76 U/L — HIGH (ref 4–32)
BILIRUB SERPL-MCNC: 0.2 MG/DL — SIGNIFICANT CHANGE UP (ref 0.2–1.2)
BUN SERPL-MCNC: 12 MG/DL — SIGNIFICANT CHANGE UP (ref 7–23)
CALCIUM SERPL-MCNC: 8.6 MG/DL — SIGNIFICANT CHANGE UP (ref 8.4–10.5)
CHLORIDE SERPL-SCNC: 105 MMOL/L — SIGNIFICANT CHANGE UP (ref 98–107)
CO2 SERPL-SCNC: 20 MMOL/L — LOW (ref 22–31)
CREAT SERPL-MCNC: 0.77 MG/DL — SIGNIFICANT CHANGE UP (ref 0.5–1.3)
D DIMER BLD IA.RAPID-MCNC: 558 NG/ML DDU — HIGH
GLUCOSE SERPL-MCNC: 140 MG/DL — HIGH (ref 70–99)
GRAM STN FLD: SIGNIFICANT CHANGE UP
HCT VFR BLD CALC: 35.6 % — SIGNIFICANT CHANGE UP (ref 34.5–45)
HCV AB S/CO SERPL IA: 0.11 S/CO — SIGNIFICANT CHANGE UP (ref 0–0.99)
HCV AB SERPL-IMP: SIGNIFICANT CHANGE UP
HGB BLD-MCNC: 11.9 G/DL — SIGNIFICANT CHANGE UP (ref 11.5–15.5)
MCHC RBC-ENTMCNC: 28.3 PG — SIGNIFICANT CHANGE UP (ref 27–34)
MCHC RBC-ENTMCNC: 33.4 GM/DL — SIGNIFICANT CHANGE UP (ref 32–36)
MCV RBC AUTO: 84.8 FL — SIGNIFICANT CHANGE UP (ref 80–100)
METHOD TYPE: SIGNIFICANT CHANGE UP
NRBC # BLD: 0 /100 WBCS — SIGNIFICANT CHANGE UP
NRBC # FLD: 0 K/UL — SIGNIFICANT CHANGE UP
PLATELET # BLD AUTO: 149 K/UL — LOW (ref 150–400)
POTASSIUM SERPL-MCNC: 4.2 MMOL/L — SIGNIFICANT CHANGE UP (ref 3.5–5.3)
POTASSIUM SERPL-SCNC: 4.2 MMOL/L — SIGNIFICANT CHANGE UP (ref 3.5–5.3)
PROT SERPL-MCNC: 7.1 G/DL — SIGNIFICANT CHANGE UP (ref 6–8.3)
RBC # BLD: 4.2 M/UL — SIGNIFICANT CHANGE UP (ref 3.8–5.2)
RBC # FLD: 13.7 % — SIGNIFICANT CHANGE UP (ref 10.3–14.5)
SARS-COV-2 IGG SERPL QL IA: NEGATIVE — SIGNIFICANT CHANGE UP
SARS-COV-2 IGM SERPL IA-ACNC: 0.1 INDEX — SIGNIFICANT CHANGE UP
SODIUM SERPL-SCNC: 139 MMOL/L — SIGNIFICANT CHANGE UP (ref 135–145)
WBC # BLD: 3.24 K/UL — LOW (ref 3.8–10.5)
WBC # FLD AUTO: 3.24 K/UL — LOW (ref 3.8–10.5)

## 2020-12-12 PROCEDURE — 99232 SBSQ HOSP IP/OBS MODERATE 35: CPT

## 2020-12-12 RX ORDER — VANCOMYCIN HCL 1 G
1000 VIAL (EA) INTRAVENOUS ONCE
Refills: 0 | Status: COMPLETED | OUTPATIENT
Start: 2020-12-12 | End: 2020-12-12

## 2020-12-12 RX ADMIN — Medication 250 MILLIGRAM(S): at 23:50

## 2020-12-12 RX ADMIN — Medication 100 MILLIGRAM(S): at 12:21

## 2020-12-12 RX ADMIN — Medication 650 MILLIGRAM(S): at 23:50

## 2020-12-12 RX ADMIN — ALBUTEROL 2 PUFF(S): 90 AEROSOL, METERED ORAL at 22:04

## 2020-12-12 RX ADMIN — Medication 100 MILLIGRAM(S): at 05:26

## 2020-12-12 RX ADMIN — ALBUTEROL 2 PUFF(S): 90 AEROSOL, METERED ORAL at 05:26

## 2020-12-12 RX ADMIN — ALBUTEROL 2 PUFF(S): 90 AEROSOL, METERED ORAL at 10:14

## 2020-12-12 RX ADMIN — Medication 100 MILLIGRAM(S): at 12:22

## 2020-12-12 RX ADMIN — ALBUTEROL 2 PUFF(S): 90 AEROSOL, METERED ORAL at 18:09

## 2020-12-12 RX ADMIN — ENOXAPARIN SODIUM 40 MILLIGRAM(S): 100 INJECTION SUBCUTANEOUS at 22:04

## 2020-12-12 NOTE — PROGRESS NOTE ADULT - PROBLEM SELECTOR PLAN 1
COVID PCR + on 12/7 (as OP) and 12/11 ([facility]).  CXR with hazy opacities.  Resp status thus far stable.  In ED given decadron 6 mg x1.  - monitor resp status and O2, will need amb sat, O2 prn  - hold off on remdesivir and steroids given sats acceptable on RA  - monitor inflammatory markers q72h, next due 12/14  - c/w supportive care with cough suppressants and albuterol MDIs

## 2020-12-12 NOTE — PROGRESS NOTE ADULT - ASSESSMENT
Ms. Orourke is a 61 yo woman with a medical hx notable for obesity and HLD admitted for monitoring of respiratory status in the setting of COVID PNA. Pt symptomatic but remains with adequate oxygenation on room air.

## 2020-12-12 NOTE — PROGRESS NOTE ADULT - SUBJECTIVE AND OBJECTIVE BOX
Patient is a 60y old  Female who presents with a chief complaint of SOB due to COVID PNA (11 Dec 2020 15:32)    SUBJECTIVE / OVERNIGHT EVENTS:      MEDICATIONS  (STANDING):  ALBUTerol    90 MICROgram(s) HFA Inhaler 2 Puff(s) Inhalation every 6 hours  benzonatate 100 milliGRAM(s) Oral every 8 hours  enoxaparin Injectable 40 milliGRAM(s) SubCutaneous at bedtime  guaiFENesin   Syrup  (Sugar-Free) 100 milliGRAM(s) Oral every 6 hours    MEDICATIONS  (PRN):  acetaminophen   Tablet .. 650 milliGRAM(s) Oral every 4 hours PRN Temp greater or equal to 38C (100.4F)      Vital Signs Last 24 Hrs  T(C): 37.1 (12 Dec 2020 10:15), Max: 37.5 (11 Dec 2020 16:16)  T(F): 98.8 (12 Dec 2020 10:15), Max: 99.5 (11 Dec 2020 16:16)  HR: 62 (12 Dec 2020 10:15) (62 - 74)  BP: 105/58 (12 Dec 2020 10:15) (105/58 - 127/60)  RR: 18 (12 Dec 2020 10:15) (18 - 23)  SpO2: 96% (12 Dec 2020 10:15) (94% - 96%)          I&O's Summary    11 Dec 2020 07:01  -  12 Dec 2020 07:00  --------------------------------------------------------  IN: 150 mL / OUT: 0 mL / NET: 150 mL          PHYSICAL EXAM  GENERAL: NAD, well-developed  HEAD:  Atraumatic, Normocephalic  EYES: EOMI, PERRLA, conjunctiva and sclera clear  NECK: Supple, No JVD  CHEST/LUNG: Clear to auscultation bilaterally; No wheeze  HEART: Regular rate and rhythm; No murmurs, rubs, or gallops  ABDOMEN: Soft, Nontender, Nondistended; Bowel sounds present  EXTREMITIES:  2+ Peripheral Pulses, No clubbing, cyanosis, or edema  PSYCH: AAOx3  SKIN: No rashes or lesions        LABS:                        11.9   3.24  )-----------( 149      ( 12 Dec 2020 07:58 )             35.6     12-12    139  |  105  |  12  ----------------------------<  140<H>  4.2   |  20<L>  |  0.77    Ca    8.6      12 Dec 2020 07:58    TPro  7.1  /  Alb  3.7  /  TBili  0.2  /  DBili  x   /  AST  76<H>  /  ALT  57<H>  /  AlkPhos  66  12-12    PT/INR - ( 11 Dec 2020 11:50 )   PT: 12.9 sec;   INR: 1.13 ratio         PTT - ( 11 Dec 2020 11:50 )  PTT:26.9 sec          RADIOLOGY & ADDITIONAL TESTS:    Imaging Personally Reviewed:  Consultant(s) Notes Reviewed:    Care Discussed with Consultants/Other Providers:   Patient is a 60y old  Female who presents with a chief complaint of SOB due to COVID PNA (11 Dec 2020 15:32)    SUBJECTIVE / OVERNIGHT EVENTS:  Patient complained of feeling SOB despite O2 sat 94% and above on RA. Patient also complained of coughing and headache. No fever, chills, chest pain, n/v or abdominal pain.    MEDICATIONS  (STANDING):  ALBUTerol    90 MICROgram(s) HFA Inhaler 2 Puff(s) Inhalation every 6 hours  benzonatate 100 milliGRAM(s) Oral every 8 hours  enoxaparin Injectable 40 milliGRAM(s) SubCutaneous at bedtime  guaiFENesin   Syrup  (Sugar-Free) 100 milliGRAM(s) Oral every 6 hours    MEDICATIONS  (PRN):  acetaminophen   Tablet .. 650 milliGRAM(s) Oral every 4 hours PRN Temp greater or equal to 38C (100.4F)      Vital Signs Last 24 Hrs  T(C): 37.1 (12 Dec 2020 10:15), Max: 37.5 (11 Dec 2020 16:16)  T(F): 98.8 (12 Dec 2020 10:15), Max: 99.5 (11 Dec 2020 16:16)  HR: 62 (12 Dec 2020 10:15) (62 - 74)  BP: 105/58 (12 Dec 2020 10:15) (105/58 - 127/60)  RR: 18 (12 Dec 2020 10:15) (18 - 23)  SpO2: 96% (12 Dec 2020 10:15) (94% - 96%)          I&O's Summary    11 Dec 2020 07:01  -  12 Dec 2020 07:00  --------------------------------------------------------  IN: 150 mL / OUT: 0 mL / NET: 150 mL          PHYSICAL EXAM  GENERAL: NAD, non-toxic appearing  CHEST/LUNG: Normal respiratory effort  HEART: Regular rate and rhythm  ABDOMEN: Soft, Nontender, Nondistended  EXTREMITIES:  2+ Peripheral Pulses, No clubbing, cyanosis, or edema  PSYCH: AAOx3, anxious        LABS:                        11.9   3.24  )-----------( 149      ( 12 Dec 2020 07:58 )             35.6     12-12    139  |  105  |  12  ----------------------------<  140<H>  4.2   |  20<L>  |  0.77    Ca    8.6      12 Dec 2020 07:58    TPro  7.1  /  Alb  3.7  /  TBili  0.2  /  DBili  x   /  AST  76<H>  /  ALT  57<H>  /  AlkPhos  66  12-12    PT/INR - ( 11 Dec 2020 11:50 )   PT: 12.9 sec;   INR: 1.13 ratio         PTT - ( 11 Dec 2020 11:50 )  PTT:26.9 sec

## 2020-12-12 NOTE — PROGRESS NOTE ADULT - PROBLEM SELECTOR PLAN 4
IMPROVE Score 3 - Lovenox for DVT prevention, pending BMI calculation may need to change to BID dosing

## 2020-12-12 NOTE — PROVIDER CONTACT NOTE (CRITICAL VALUE NOTIFICATION) - ASSESSMENT
patient noted to be anxious and c/o SOB. provider made aware of current vitals and concerns with hypotension as patient states her BP is normally significantly more elevated than it currently is. patient is afebrile

## 2020-12-13 LAB
ANION GAP SERPL CALC-SCNC: 12 MMOL/L — SIGNIFICANT CHANGE UP (ref 7–14)
BUN SERPL-MCNC: 14 MG/DL — SIGNIFICANT CHANGE UP (ref 7–23)
CALCIUM SERPL-MCNC: 8.3 MG/DL — LOW (ref 8.4–10.5)
CHLORIDE SERPL-SCNC: 105 MMOL/L — SIGNIFICANT CHANGE UP (ref 98–107)
CO2 SERPL-SCNC: 24 MMOL/L — SIGNIFICANT CHANGE UP (ref 22–31)
CREAT SERPL-MCNC: 0.82 MG/DL — SIGNIFICANT CHANGE UP (ref 0.5–1.3)
CULTURE RESULTS: SIGNIFICANT CHANGE UP
GLUCOSE SERPL-MCNC: 100 MG/DL — HIGH (ref 70–99)
HCT VFR BLD CALC: 34.3 % — LOW (ref 34.5–45)
HGB BLD-MCNC: 11.3 G/DL — LOW (ref 11.5–15.5)
MCHC RBC-ENTMCNC: 27.7 PG — SIGNIFICANT CHANGE UP (ref 27–34)
MCHC RBC-ENTMCNC: 32.9 GM/DL — SIGNIFICANT CHANGE UP (ref 32–36)
MCV RBC AUTO: 84.1 FL — SIGNIFICANT CHANGE UP (ref 80–100)
NRBC # BLD: 0 /100 WBCS — SIGNIFICANT CHANGE UP
NRBC # FLD: 0 K/UL — SIGNIFICANT CHANGE UP
ORGANISM # SPEC MICROSCOPIC CNT: SIGNIFICANT CHANGE UP
ORGANISM # SPEC MICROSCOPIC CNT: SIGNIFICANT CHANGE UP
PLATELET # BLD AUTO: 155 K/UL — SIGNIFICANT CHANGE UP (ref 150–400)
POTASSIUM SERPL-MCNC: 3.9 MMOL/L — SIGNIFICANT CHANGE UP (ref 3.5–5.3)
POTASSIUM SERPL-SCNC: 3.9 MMOL/L — SIGNIFICANT CHANGE UP (ref 3.5–5.3)
RBC # BLD: 4.08 M/UL — SIGNIFICANT CHANGE UP (ref 3.8–5.2)
RBC # FLD: 13.6 % — SIGNIFICANT CHANGE UP (ref 10.3–14.5)
SODIUM SERPL-SCNC: 141 MMOL/L — SIGNIFICANT CHANGE UP (ref 135–145)
SPECIMEN SOURCE: SIGNIFICANT CHANGE UP
WBC # BLD: 5.39 K/UL — SIGNIFICANT CHANGE UP (ref 3.8–10.5)
WBC # FLD AUTO: 5.39 K/UL — SIGNIFICANT CHANGE UP (ref 3.8–10.5)

## 2020-12-13 PROCEDURE — 99233 SBSQ HOSP IP/OBS HIGH 50: CPT

## 2020-12-13 RX ORDER — ONDANSETRON 8 MG/1
4 TABLET, FILM COATED ORAL EVERY 4 HOURS
Refills: 0 | Status: DISCONTINUED | OUTPATIENT
Start: 2020-12-13 | End: 2020-12-29

## 2020-12-13 RX ORDER — GUAIFENESIN/DEXTROMETHORPHAN 600MG-30MG
10 TABLET, EXTENDED RELEASE 12 HR ORAL EVERY 4 HOURS
Refills: 0 | Status: DISCONTINUED | OUTPATIENT
Start: 2020-12-13 | End: 2020-12-17

## 2020-12-13 RX ORDER — KETOROLAC TROMETHAMINE 30 MG/ML
30 SYRINGE (ML) INJECTION ONCE
Refills: 0 | Status: DISCONTINUED | OUTPATIENT
Start: 2020-12-13 | End: 2020-12-13

## 2020-12-13 RX ORDER — KETOROLAC TROMETHAMINE 30 MG/ML
15 SYRINGE (ML) INJECTION ONCE
Refills: 0 | Status: DISCONTINUED | OUTPATIENT
Start: 2020-12-13 | End: 2020-12-14

## 2020-12-13 RX ADMIN — Medication 100 MILLIGRAM(S): at 13:21

## 2020-12-13 RX ADMIN — Medication 650 MILLIGRAM(S): at 15:00

## 2020-12-13 RX ADMIN — ALBUTEROL 2 PUFF(S): 90 AEROSOL, METERED ORAL at 05:42

## 2020-12-13 RX ADMIN — Medication 100 MILLIGRAM(S): at 21:00

## 2020-12-13 RX ADMIN — ONDANSETRON 4 MILLIGRAM(S): 8 TABLET, FILM COATED ORAL at 20:58

## 2020-12-13 RX ADMIN — Medication 650 MILLIGRAM(S): at 14:33

## 2020-12-13 RX ADMIN — Medication 650 MILLIGRAM(S): at 20:45

## 2020-12-13 RX ADMIN — ENOXAPARIN SODIUM 40 MILLIGRAM(S): 100 INJECTION SUBCUTANEOUS at 21:00

## 2020-12-13 RX ADMIN — Medication 650 MILLIGRAM(S): at 01:30

## 2020-12-13 RX ADMIN — Medication 650 MILLIGRAM(S): at 21:41

## 2020-12-13 RX ADMIN — ALBUTEROL 2 PUFF(S): 90 AEROSOL, METERED ORAL at 10:32

## 2020-12-13 NOTE — PROGRESS NOTE ADULT - SUBJECTIVE AND OBJECTIVE BOX
Patient is a 60y old  Female who presents with a chief complaint of SOB due to COVID PNA (12 Dec 2020 14:14)        SUBJECTIVE / OVERNIGHT EVENTS:  Patient with Tmax 100.5 overnight. Pt given Vancomycin dose x1 for noted +blood cx. Of note, pt placed on 3L O2 via NC.   Patient is very anxious and feels SOB, despite oxygenation at % on RA. Pt with nausea, decreased appetite, body aches and HA.     MEDICATIONS  (STANDING):  benzonatate 100 milliGRAM(s) Oral every 8 hours  enoxaparin Injectable 40 milliGRAM(s) SubCutaneous at bedtime  ketorolac   Injectable 30 milliGRAM(s) IV Push once    MEDICATIONS  (PRN):  acetaminophen   Tablet .. 650 milliGRAM(s) Oral every 4 hours PRN Temp greater or equal to 38C (100.4F)  guaifenesin/dextromethorphan  Syrup 10 milliLiter(s) Oral every 4 hours PRN Cough  ondansetron Injectable 4 milliGRAM(s) IV Push every 4 hours PRN Nausea and/or Vomiting      Vital Signs Last 24 Hrs  T(C): 37.1 (13 Dec 2020 05:42), Max: 38.1 (12 Dec 2020 23:41)  T(F): 98.7 (13 Dec 2020 05:42), Max: 100.5 (12 Dec 2020 23:41)  HR: 71 (13 Dec 2020 05:42) (63 - 81)  BP: 111/60 (13 Dec 2020 05:42) (100/50 - 111/60)  BP(mean): 72 (13 Dec 2020 05:42) (60 - 72)  RR: 20 (13 Dec 2020 05:42) (18 - 24)  SpO2: 96% (13 Dec 2020 05:42) (94% - 97%)          PHYSICAL EXAM  GENERAL: NAD, well-developed  HEAD:  Atraumatic, Normocephalic  EYES: EOMI, PERRLA, conjunctiva and sclera clear  NECK: Supple, No JVD  CHEST/LUNG: Clear to auscultation bilaterally; No wheeze  HEART: Regular rate and rhythm; No murmurs, rubs, or gallops  ABDOMEN: Soft, Nontender, Nondistended; Bowel sounds present  EXTREMITIES:  2+ Peripheral Pulses, No clubbing, cyanosis, or edema  PSYCH: AAOx3  SKIN: No rashes or lesions    LABS:                        11.3   5.39  )-----------( 155      ( 13 Dec 2020 06:39 )             34.3     12-13    141  |  105  |  14  ----------------------------<  100<H>  3.9   |  24  |  0.82    Ca    8.3<L>      13 Dec 2020 06:39    TPro  7.1  /  Alb  3.7  /  TBili  0.2  /  DBili  x   /  AST  76<H>  /  ALT  57<H>  /  AlkPhos  66  12-12              RADIOLOGY & ADDITIONAL TESTS:    Imaging Personally Reviewed:  Consultant(s) Notes Reviewed:    Care Discussed with Consultants/Other Providers:   Patient is a 60y old  Female who presents with a chief complaint of SOB due to COVID PNA (12 Dec 2020 14:14)        SUBJECTIVE / OVERNIGHT EVENTS:  Patient with Tmax 100.5 overnight. Pt given Vancomycin dose x1 for noted +blood cx. Of note, pt placed on 3L O2 via NC.   Patient is very anxious and feels SOB, despite oxygenation at % on RA. Pt with nausea, decreased appetite, body aches and HA.     MEDICATIONS  (STANDING):  benzonatate 100 milliGRAM(s) Oral every 8 hours  enoxaparin Injectable 40 milliGRAM(s) SubCutaneous at bedtime  ketorolac   Injectable 30 milliGRAM(s) IV Push once    MEDICATIONS  (PRN):  acetaminophen   Tablet .. 650 milliGRAM(s) Oral every 4 hours PRN Temp greater or equal to 38C (100.4F)  guaifenesin/dextromethorphan  Syrup 10 milliLiter(s) Oral every 4 hours PRN Cough  ondansetron Injectable 4 milliGRAM(s) IV Push every 4 hours PRN Nausea and/or Vomiting      Vital Signs Last 24 Hrs  T(C): 37.1 (13 Dec 2020 05:42), Max: 38.1 (12 Dec 2020 23:41)  T(F): 98.7 (13 Dec 2020 05:42), Max: 100.5 (12 Dec 2020 23:41)  HR: 71 (13 Dec 2020 05:42) (63 - 81)  BP: 111/60 (13 Dec 2020 05:42) (100/50 - 111/60)  BP(mean): 72 (13 Dec 2020 05:42) (60 - 72)  RR: 20 (13 Dec 2020 05:42) (18 - 24)  SpO2: 96% (13 Dec 2020 05:42) (94% - 97%)          PHYSICAL EXAM  GENERAL: NAD, non-toxic appearing, on RA sat'ing >94%  CHEST/LUNG: Normal respiratory effort  HEART: Regular rate and rhythm  ABDOMEN: Soft, Nontender, Nondistended  EXTREMITIES:  2+ Peripheral Pulses, No clubbing, cyanosis, or edema  PSYCH: AAOx3, very anxious        LABS:                        11.3   5.39  )-----------( 155      ( 13 Dec 2020 06:39 )             34.3     12-13    141  |  105  |  14  ----------------------------<  100<H>  3.9   |  24  |  0.82    Ca    8.3<L>      13 Dec 2020 06:39    TPro  7.1  /  Alb  3.7  /  TBili  0.2  /  DBili  x   /  AST  76<H>  /  ALT  57<H>  /  AlkPhos  66  12-12                    Culture - Blood (collected 11 Dec 2020 14:05)  Source: .Blood Blood-Peripheral  Preliminary Report (12 Dec 2020 15:05):    No growth to date.    Culture - Blood (collected 11 Dec 2020 10:56)  Source: .Blood Blood-Peripheral  Gram Stain (12 Dec 2020 21:32):    Growth in aerobic bottle: Gram Positive Cocci in Clusters  Final Report (13 Dec 2020 18:22):    Growth in aerobic bottle: Staphylococcus hominis    Coag Negative Staphylococcus    Single set isolate, possible contaminant. Contact    Microbiology if susceptibility testing clinically    indicated.    "Due to technical problems, Proteus sp. will Not be reported as part of    the BCID panel until further notice"    ***Blood Panel PCR results on this specimen are available    approximately 3 hours after the Gram stain result.***    Gram stain, PCR, and/or culture results may not always    correspond due to difference in methodologies.    ************************************************************    This PCR assay was performed using [facility].    The following targets are tested for: Enterococcus,    vancomycin resistant enterococci, Listeria monocytogenes,    coagulase negative staphylococci, S. aureus,    methicillin resistant S. aureus, Streptococcus agalactiae    (Group B), S. pneumoniae, S. pyogenes (Group A),    Acinetobacter baumannii, Enterobacter cloacae, E. coli,    Klebsiella oxytoca, K. pneumoniae, Proteus sp.,    Serratia marcescens, Haemophilus influenzae,    Neisseria meningitidis, Pseudomonas aeruginosa, Candida    albicans, C. glabrata, C krusei, C parapsilosis,    C. tropicalis and the KPC resistance gene.  Organism: Blood Culture PCR (13 Dec 2020 18:22)  Organism: Blood Culture PCR (13 Dec 2020 18:22)

## 2020-12-13 NOTE — PROGRESS NOTE ADULT - PROBLEM SELECTOR PLAN 2
COVID PCR + on 12/7 (as OP) and 12/11 ([facility]).  CXR with hazy opacities.  Resp status thus far stable. However, pt symptomatic. In ED given decadron 6 mg x1.  - monitor resp status and O2, will need amb sat, O2 prn  - hold off on remdesivir and steroids given sats acceptable on RA  - monitor inflammatory markers q72h, next due 12/14  - c/w supportive care with cough suppressants and albuterol MDIs  - gave ketorolac 15mg x1 dose for body aches and headache. Will start standing oral Tylenol 975mg q8h

## 2020-12-13 NOTE — CHART NOTE - NSCHARTNOTEFT_GEN_A_CORE
Patient is a 60y old  Female who presents with a chief complaint of SOB due to COVID PNA     Called by RN to evaluate pt. with c/o headache and 'not feeling well' , also with positive blood cultures from(12/11)just came back gram positive cocci in clusters  Vital Signs Last 24 Hrs  T(C): 38.1 (12 Dec 2020 23:41), Max: 38.1 (12 Dec 2020 23:41)  T(F): 100.5 (12 Dec 2020 23:41), Max: 100.5 (12 Dec 2020 23:41)  HR: 81 (12 Dec 2020 23:41) (62 - 81)  BP: 107/57 (12 Dec 2020 23:41) (100/50 - 113/65)  BP(mean): 60 (12 Dec 2020 20:32) (60 - 60)  RR: 22 (12 Dec 2020 23:41) (18 - 24)  SpO2: 96% (12 Dec 2020 23:41) (94% - 97%)    Subjective:  Patient reports- " not feeling well." Patient c/o headache, chills, and generalised weakness    Physical exam:    Constitutional: Anxious female, well developed  Neuro: Axox3, anxious,   Resp: Clear to auscultation bilaterally; No wheeze  HEART: Regular rate and rhythm; No murmurs, rubs, or gallops  ABDOMEN: Soft, Nontender, Nondistended; Bowel sounds present  EXTREMITIES:  2+ Peripheral Pulses, No clubbing, cyanosis, or edema              PT/INR - ( 11 Dec 2020 11:50 )   PT: 12.9 sec;   INR: 1.13 ratio         PTT - ( 11 Dec 2020 11:50 )  PTT:26.9 sec                        11.9   3.24  )-----------( 149      ( 12 Dec 2020 07:58 )             35.6     12-12    139  |  105  |  12  ----------------------------<  140<H>  4.2   |  20<L>  |  0.77    Ca    8.6      12 Dec 2020 07:58    TPro  7.1  /  Alb  3.7  /  TBili  0.2  /  DBili  x   /  AST  76<H>  /  ALT  57<H>  /  AlkPhos  66  12-12    Plan:   - Blood culures from 12/11- positive for gram positive cocci in clusters  -Repeat blood cultures now  - Tylenol 650 mg po now as pt spiked Temp now  - Vancomycin 1 gm iv x dose for now  - F/U with repeat blood c/s and consider ID c/s in am Patient is a 60y old  Female who presents with a chief complaint of SOB due to COVID PNA     Called by RN to evaluate pt. with c/o headache and 'not feeling well' ,Shortness of breath on RA, also with positive blood cultures from(12/11)just came back gram positive cocci in clusters  Vital Signs Last 24 Hrs  T(C): 38.1 (12 Dec 2020 23:41), Max: 38.1 (12 Dec 2020 23:41)  T(F): 100.5 (12 Dec 2020 23:41), Max: 100.5 (12 Dec 2020 23:41)  HR: 81 (12 Dec 2020 23:41) (62 - 81)  BP: 107/57 (12 Dec 2020 23:41) (100/50 - 113/65)  BP(mean): 60 (12 Dec 2020 20:32) (60 - 60)  RR: 22 (12 Dec 2020 23:41) (18 - 24)  SpO2: 96% (12 Dec 2020 23:41) (94% - 97%)    Subjective:  Patient reports- " not feeling well." Patient c/o headache, chills, and generalised weakness, Reports breathing better with 2L    Physical exam:    Constitutional: Anxious female, well developed  Neuro: Axox3, anxious,   Resp: Clear to auscultation bilaterally; No wheeze  HEART: Regular rate and rhythm; No murmurs, rubs, or gallops  ABDOMEN: Soft, Nontender, Nondistended; Bowel sounds present  EXTREMITIES:  2+ Peripheral Pulses, No clubbing, cyanosis, or edema      PT/INR - ( 11 Dec 2020 11:50 )   PT: 12.9 sec;   INR: 1.13 ratio         PTT - ( 11 Dec 2020 11:50 )  PTT:26.9 sec                        11.9   3.24  )-----------( 149      ( 12 Dec 2020 07:58 )             35.6     12-12    139  |  105  |  12  ----------------------------<  140<H>  4.2   |  20<L>  |  0.77    Ca    8.6      12 Dec 2020 07:58    TPro  7.1  /  Alb  3.7  /  TBili  0.2  /  DBili  x   /  AST  76<H>  /  ALT  57<H>  /  AlkPhos  66  12-12    Plan:   - Blood culures from 12/11- positive for gram positive cocci in clusters  -Repeat blood cultures now  - Tylenol 650 mg po now as pt spiked Temp now  - Vancomycin 1 gm iv x dose for now  - F/U with repeat blood c/s and consider ID c/s in am  - Continue with 2L NC for now   - Continue Albuterol 2 puffs Q4H

## 2020-12-13 NOTE — PROGRESS NOTE ADULT - ASSESSMENT
Ms. Orourke is a 59 yo woman with a medical hx notable for obesity and HLD admitted for monitoring of respiratory status in the setting of COVID PNA. Pt symptomatic but remains with adequate oxygenation on room air. Noted positive blood cx in 1 of 2 sets s/p dose of Vancomycin but likely contaminant since noted to be coag negative staph.

## 2020-12-13 NOTE — PROGRESS NOTE ADULT - PROBLEM SELECTOR PLAN 1
1 out of 2 sets noted to have Staph hominis. Suspect contaminant. Noted mild fever but can be attributed to COVID-19 infection. Pt remains hemodynamically stable and with normal WBC  - f/u repeat blood cx obtained today  - defer further abx for now unless pt noted to decompensate clinically.

## 2020-12-14 DIAGNOSIS — Z51.81 ENCOUNTER FOR THERAPEUTIC DRUG LEVEL MONITORING: ICD-10-CM

## 2020-12-14 DIAGNOSIS — U07.1 COVID-19: ICD-10-CM

## 2020-12-14 DIAGNOSIS — R78.81 BACTEREMIA: ICD-10-CM

## 2020-12-14 DIAGNOSIS — R74.01 ELEVATION OF LEVELS OF LIVER TRANSAMINASE LEVELS: ICD-10-CM

## 2020-12-14 DIAGNOSIS — R50.9 FEVER, UNSPECIFIED: ICD-10-CM

## 2020-12-14 DIAGNOSIS — R05 COUGH: ICD-10-CM

## 2020-12-14 DIAGNOSIS — R09.02 HYPOXEMIA: ICD-10-CM

## 2020-12-14 LAB
ALBUMIN SERPL ELPH-MCNC: 3.7 G/DL — SIGNIFICANT CHANGE UP (ref 3.3–5)
ALP SERPL-CCNC: 65 U/L — SIGNIFICANT CHANGE UP (ref 40–120)
ALT FLD-CCNC: 46 U/L — HIGH (ref 4–33)
ANION GAP SERPL CALC-SCNC: 13 MMOL/L — SIGNIFICANT CHANGE UP (ref 7–14)
AST SERPL-CCNC: 52 U/L — HIGH (ref 4–32)
BILIRUB DIRECT SERPL-MCNC: <0.2 MG/DL — SIGNIFICANT CHANGE UP (ref 0–0.2)
BILIRUB INDIRECT FLD-MCNC: >0.1 MG/DL — SIGNIFICANT CHANGE UP (ref 0–1)
BILIRUB SERPL-MCNC: 0.3 MG/DL — SIGNIFICANT CHANGE UP (ref 0.2–1.2)
BUN SERPL-MCNC: 12 MG/DL — SIGNIFICANT CHANGE UP (ref 7–23)
CALCIUM SERPL-MCNC: 8.1 MG/DL — LOW (ref 8.4–10.5)
CHLORIDE SERPL-SCNC: 102 MMOL/L — SIGNIFICANT CHANGE UP (ref 98–107)
CO2 SERPL-SCNC: 22 MMOL/L — SIGNIFICANT CHANGE UP (ref 22–31)
CREAT SERPL-MCNC: 0.86 MG/DL — SIGNIFICANT CHANGE UP (ref 0.5–1.3)
CREAT SERPL-MCNC: 0.97 MG/DL — SIGNIFICANT CHANGE UP (ref 0.5–1.3)
CRP SERPL-MCNC: 87 MG/L — HIGH
D DIMER BLD IA.RAPID-MCNC: 765 NG/ML DDU — HIGH
FERRITIN SERPL-MCNC: 448 NG/ML — HIGH (ref 15–150)
GLUCOSE SERPL-MCNC: 101 MG/DL — HIGH (ref 70–99)
HCT VFR BLD CALC: 34.1 % — LOW (ref 34.5–45)
HGB BLD-MCNC: 10.9 G/DL — LOW (ref 11.5–15.5)
LDH SERPL L TO P-CCNC: 422 U/L — HIGH (ref 135–225)
MCHC RBC-ENTMCNC: 28 PG — SIGNIFICANT CHANGE UP (ref 27–34)
MCHC RBC-ENTMCNC: 32 GM/DL — SIGNIFICANT CHANGE UP (ref 32–36)
MCV RBC AUTO: 87.7 FL — SIGNIFICANT CHANGE UP (ref 80–100)
NRBC # BLD: 0 /100 WBCS — SIGNIFICANT CHANGE UP
NRBC # FLD: 0 K/UL — SIGNIFICANT CHANGE UP
PLATELET # BLD AUTO: 158 K/UL — SIGNIFICANT CHANGE UP (ref 150–400)
POTASSIUM SERPL-MCNC: 3.7 MMOL/L — SIGNIFICANT CHANGE UP (ref 3.5–5.3)
POTASSIUM SERPL-SCNC: 3.7 MMOL/L — SIGNIFICANT CHANGE UP (ref 3.5–5.3)
PROCALCITONIN SERPL-MCNC: 0.1 NG/ML — SIGNIFICANT CHANGE UP (ref 0.02–0.1)
PROT SERPL-MCNC: 6.9 G/DL — SIGNIFICANT CHANGE UP (ref 6–8.3)
RBC # BLD: 3.89 M/UL — SIGNIFICANT CHANGE UP (ref 3.8–5.2)
RBC # FLD: 14.2 % — SIGNIFICANT CHANGE UP (ref 10.3–14.5)
SODIUM SERPL-SCNC: 137 MMOL/L — SIGNIFICANT CHANGE UP (ref 135–145)
WBC # BLD: 5.32 K/UL — SIGNIFICANT CHANGE UP (ref 3.8–10.5)
WBC # FLD AUTO: 5.32 K/UL — SIGNIFICANT CHANGE UP (ref 3.8–10.5)

## 2020-12-14 PROCEDURE — 99233 SBSQ HOSP IP/OBS HIGH 50: CPT

## 2020-12-14 PROCEDURE — 99223 1ST HOSP IP/OBS HIGH 75: CPT

## 2020-12-14 RX ORDER — REMDESIVIR 5 MG/ML
100 INJECTION INTRAVENOUS EVERY 24 HOURS
Refills: 0 | Status: COMPLETED | OUTPATIENT
Start: 2020-12-15 | End: 2020-12-18

## 2020-12-14 RX ORDER — KETOROLAC TROMETHAMINE 30 MG/ML
15 SYRINGE (ML) INJECTION ONCE
Refills: 0 | Status: DISCONTINUED | OUTPATIENT
Start: 2020-12-14 | End: 2020-12-14

## 2020-12-14 RX ORDER — REMDESIVIR 5 MG/ML
INJECTION INTRAVENOUS
Refills: 0 | Status: COMPLETED | OUTPATIENT
Start: 2020-12-14 | End: 2020-12-18

## 2020-12-14 RX ORDER — ACETAMINOPHEN 500 MG
975 TABLET ORAL EVERY 8 HOURS
Refills: 0 | Status: DISCONTINUED | OUTPATIENT
Start: 2020-12-14 | End: 2020-12-18

## 2020-12-14 RX ORDER — REMDESIVIR 5 MG/ML
200 INJECTION INTRAVENOUS EVERY 24 HOURS
Refills: 0 | Status: COMPLETED | OUTPATIENT
Start: 2020-12-14 | End: 2020-12-14

## 2020-12-14 RX ORDER — DEXAMETHASONE 0.5 MG/5ML
6 ELIXIR ORAL EVERY 24 HOURS
Refills: 0 | Status: DISCONTINUED | OUTPATIENT
Start: 2020-12-14 | End: 2020-12-14

## 2020-12-14 RX ORDER — DEXAMETHASONE 0.5 MG/5ML
6 ELIXIR ORAL EVERY 24 HOURS
Refills: 0 | Status: COMPLETED | OUTPATIENT
Start: 2020-12-14 | End: 2020-12-18

## 2020-12-14 RX ORDER — ACETAMINOPHEN 500 MG
975 TABLET ORAL EVERY 8 HOURS
Refills: 0 | Status: DISCONTINUED | OUTPATIENT
Start: 2020-12-14 | End: 2020-12-14

## 2020-12-14 RX ADMIN — ENOXAPARIN SODIUM 40 MILLIGRAM(S): 100 INJECTION SUBCUTANEOUS at 21:17

## 2020-12-14 RX ADMIN — Medication 975 MILLIGRAM(S): at 07:22

## 2020-12-14 RX ADMIN — ONDANSETRON 4 MILLIGRAM(S): 8 TABLET, FILM COATED ORAL at 05:45

## 2020-12-14 RX ADMIN — Medication 15 MILLIGRAM(S): at 16:00

## 2020-12-14 RX ADMIN — REMDESIVIR 500 MILLIGRAM(S): 5 INJECTION INTRAVENOUS at 16:00

## 2020-12-14 RX ADMIN — Medication 100 MILLIGRAM(S): at 05:46

## 2020-12-14 RX ADMIN — Medication 975 MILLIGRAM(S): at 06:11

## 2020-12-14 RX ADMIN — Medication 100 MILLIGRAM(S): at 13:07

## 2020-12-14 RX ADMIN — Medication 6 MILLIGRAM(S): at 16:00

## 2020-12-14 RX ADMIN — Medication 15 MILLIGRAM(S): at 05:46

## 2020-12-14 RX ADMIN — Medication 15 MILLIGRAM(S): at 07:22

## 2020-12-14 NOTE — PROGRESS NOTE ADULT - PROBLEM SELECTOR PLAN 1
1 out of 2 sets noted to have Staph hominis. Suspect contaminant. Noted mild fever but can be attributed to COVID-19 infection. Pt remains hemodynamically stable and with normal WBC  - repeat blood cx  NGTD   - defer further abx for now   -ID eval appreciated

## 2020-12-14 NOTE — CONSULT NOTE ADULT - PROBLEM SELECTOR RECOMMENDATION 9
-supportive care  -maintain oxygenation saturation  -monitor fever curve  -continue airborne/contact isolation  -cont dexamethasone 6 mg daily  -cont remdesevir 200 mg iv x 1 then 100 mg iv q24 x 4 days  -supportive care  -monitor pulse ox  -contact/airborne precautions   -wean O2 as tolerated  -trend inflammatory markers

## 2020-12-14 NOTE — PROGRESS NOTE ADULT - PROBLEM SELECTOR PLAN 2
COVID PCR + on 12/7 (as OP) and 12/11 ([facility]).  CXR with hazy opacities.  pt symptomatic. In ED given decadron 6 mg x1.  - Pt saturating 93-97% on 2L NC   -ID eval appreciated, recommend staring Remdesivir and dexamethasone    -Monitor inflammatory markers, LFTs and renal fn closely   - c/w supportive care with cough suppressants and albuterol MDIs  - PRN Tylenol for  body aches and headache.

## 2020-12-14 NOTE — CONSULT NOTE ADULT - PROBLEM SELECTOR RECOMMENDATION 5
New Patient Encounter    New Patient Intake Form   Patient Details:  Daniela Shepherd  1979  [id number]    Background Information:  [id number] starts by opening a telephone encounter and gathering the following information   Who is calling to schedule? If not self, relationship to patient? Beto   Referring Provider Mary Denton   What is the diagnosis? Breast ca   When was the diagnosis? 9/24/2019   Is patient aware of diagnosis? Yes   Reason for visit? NP DX   Have you had any testing done? If so: when, where? Yes   Are records in [facility]? yes   Was the patient told to bring a disk? no   Scheduling Information:   Preferred [place]:  [place]     Requesting Specific Provider? Bozena Toney   Are there any dates/time the patient cannot be seen? Any date ok   Counseling Pre-Screen:  If the patient answers YES to any of the below questions, please route to the appropriate location specific counselor    Have you felt anxious or worried about cancer and the treatment you are receiving? Did Not Speak to Patient   Has your diagnosis caused physical, emotional, or financial hardship for you? Did Not Speak to Patient   Note: Do not ask the patient about transportation issues/needs  Please notate if the patient brings it up and the counselor will schedule accordingly  Miscellaneous: Yuly martin requesting Bozena Toney   After completing the above information, please route to Financial Counselor and the appropriate Nurse Navigator for review  -from covid  -supportive care

## 2020-12-14 NOTE — PROGRESS NOTE ADULT - SUBJECTIVE AND OBJECTIVE BOX
Patient is a 60y old  Female who presents with a chief complaint of SOB due to COVID PNA (14 Dec 2020 12:59)      SUBJECTIVE / OVERNIGHT EVENTS: patient seen and examined by bedside, c/o feeling weak, chest pain , sob, denies headache, dizziness, Palpitations , N/V/D, abdominal pain        MEDICATIONS  (STANDING):  dexAMETHasone  Injectable 6 milliGRAM(s) IV Push every 24 hours  enoxaparin Injectable 40 milliGRAM(s) SubCutaneous at bedtime  remdesivir  IVPB   IV Intermittent   remdesivir  IVPB 200 milliGRAM(s) IV Intermittent every 24 hours    MEDICATIONS  (PRN):  acetaminophen   Tablet .. 975 milliGRAM(s) Oral every 8 hours PRN Temp greater or equal to 38C (100.4F)  guaifenesin/dextromethorphan  Syrup 10 milliLiter(s) Oral every 4 hours PRN Cough  ondansetron Injectable 4 milliGRAM(s) IV Push every 4 hours PRN Nausea and/or Vomiting      Vital Signs Last 24 Hrs  T(C): 36.6 (14 Dec 2020 13:05), Max: 39.4 (13 Dec 2020 20:34)  T(F): 97.9 (14 Dec 2020 13:05), Max: 103 (13 Dec 2020 20:34)  HR: 69 (14 Dec 2020 13:05) (67 - 93)  BP: 107/54 (14 Dec 2020 13:05) (97/53 - 127/62)  BP(mean): --  RR: 19 (14 Dec 2020 13:05) (18 - 20)  SpO2: 97% (14 Dec 2020 13:05) (93% - 97%)  CAPILLARY BLOOD GLUCOSE        I&O's Summary      PHYSICAL EXAM:  GENERAL: NAD,  anxious   HEAD:  Atraumatic, Normocephalic  EYES: EOMI, PERRLA, conjunctiva and sclera clear  NECK: Supple  CHEST/LUNG: Clear to auscultation bilaterally; No wheeze  HEART: Regular rate and rhythm;   ABDOMEN: Soft, Nontender, Nondistended; Bowel sounds present  EXTREMITIES:  2+ Peripheral Pulses, No clubbing, cyanosis, or edema  NEUROLOGY: non-focal, AAOX3  SKIN: No rashes or lesions    LABS:                        10.9   5.32  )-----------( 158      ( 14 Dec 2020 07:20 )             34.1     12-14    137  |  102  |  12  ----------------------------<  101<H>  3.7   |  22  |  0.86    Ca    8.1<L>      14 Dec 2020 07:20                RADIOLOGY & ADDITIONAL TESTS:    Imaging Personally Reviewed:    Consultant(s) Notes Reviewed:  ID     Care Discussed with Consultants/Other Providers: ID

## 2020-12-14 NOTE — PROGRESS NOTE ADULT - ASSESSMENT
Ms. Orourke is a 61 yo woman with a medical hx notable for obesity and HLD admitted for monitoring of respiratory status in the setting of COVID PNA. Pt symptomatic but remains with adequate oxygenation on room air. Noted positive blood cx in 1 of 2 sets s/p dose of Vancomycin but likely contaminant since noted to be coag negative staph.

## 2020-12-15 PROCEDURE — 99233 SBSQ HOSP IP/OBS HIGH 50: CPT

## 2020-12-15 PROCEDURE — 99232 SBSQ HOSP IP/OBS MODERATE 35: CPT

## 2020-12-15 RX ADMIN — Medication 6 MILLIGRAM(S): at 16:57

## 2020-12-15 RX ADMIN — REMDESIVIR 500 MILLIGRAM(S): 5 INJECTION INTRAVENOUS at 14:49

## 2020-12-15 RX ADMIN — Medication 10 MILLILITER(S): at 02:57

## 2020-12-15 RX ADMIN — Medication 10 MILLILITER(S): at 21:31

## 2020-12-15 RX ADMIN — Medication 10 MILLILITER(S): at 11:24

## 2020-12-15 RX ADMIN — ENOXAPARIN SODIUM 40 MILLIGRAM(S): 100 INJECTION SUBCUTANEOUS at 21:23

## 2020-12-15 NOTE — PROGRESS NOTE ADULT - PROBLEM SELECTOR PLAN 1
-supportive care  -maintain oxygenation saturation  -monitor fever curve  -continue airborne/contact isolation  -cont dexamethasone 6 mg daily  -cont remdesevir 100 mg iv q24  -supportive care  -monitor pulse ox  -contact/airborne precautions   -wean O2 as tolerated  -trend inflammatory markers

## 2020-12-15 NOTE — PROGRESS NOTE ADULT - SUBJECTIVE AND OBJECTIVE BOX
Patient is a 60y old  Female who presents with a chief complaint of SOB due to COVID PNA (14 Dec 2020 15:11)      SUBJECTIVE / OVERNIGHT EVENTS:    MEDICATIONS  (STANDING):  dexAMETHasone  Injectable 6 milliGRAM(s) IV Push every 24 hours  enoxaparin Injectable 40 milliGRAM(s) SubCutaneous at bedtime  remdesivir  IVPB   IV Intermittent   remdesivir  IVPB 100 milliGRAM(s) IV Intermittent every 24 hours    MEDICATIONS  (PRN):  acetaminophen   Tablet .. 975 milliGRAM(s) Oral every 8 hours PRN Temp greater or equal to 38C (100.4F)  guaifenesin/dextromethorphan  Syrup 10 milliLiter(s) Oral every 4 hours PRN Cough  ondansetron Injectable 4 milliGRAM(s) IV Push every 4 hours PRN Nausea and/or Vomiting      Vital Signs Last 24 Hrs  T(C): 36.9 (15 Dec 2020 05:00), Max: 37.3 (14 Dec 2020 21:13)  T(F): 98.4 (15 Dec 2020 05:00), Max: 99.2 (14 Dec 2020 21:13)  HR: 78 (15 Dec 2020 05:00) (60 - 78)  BP: 101/56 (15 Dec 2020 05:00) (101/56 - 107/54)  BP(mean): --  RR: 20 (15 Dec 2020 05:00) (19 - 20)  SpO2: 95% (15 Dec 2020 05:00) (79% - 100%)  CAPILLARY BLOOD GLUCOSE        I&O's Summary      PHYSICAL EXAM:  GENERAL: NAD, well-developed  HEAD:  Atraumatic, Normocephalic  EYES: EOMI, PERRLA, conjunctiva and sclera clear  NECK: Supple, No JVD  CHEST/LUNG: Clear to auscultation bilaterally; No wheeze  HEART: Regular rate and rhythm; No murmurs, rubs, or gallops  ABDOMEN: Soft, Nontender, Nondistended; Bowel sounds present  EXTREMITIES:  2+ Peripheral Pulses, No clubbing, cyanosis, or edema  PSYCH: AAOx3  NEUROLOGY: non-focal  SKIN: No rashes or lesions    LABS:                        10.9   5.32  )-----------( 158      ( 14 Dec 2020 07:20 )             34.1     12-14    x   |  x   |  x   ----------------------------<  x   x    |  x   |  0.97    Ca    8.1<L>      14 Dec 2020 07:20    TPro  6.9  /  Alb  3.7  /  TBili  0.3  /  DBili  <0.2  /  AST  52<H>  /  ALT  46<H>  /  AlkPhos  65  12-14              RADIOLOGY & ADDITIONAL TESTS:    Imaging Personally Reviewed:    Consultant(s) Notes Reviewed:      Care Discussed with Consultants/Other Providers:   Patient is a 60y old  Female who presents with a chief complaint of SOB due to COVID PNA (14 Dec 2020 15:11)      SUBJECTIVE / OVERNIGHT EVENTS: patient seen and examined by bedside, pt feeling better than yesterday, still having cough, SOB , headache, CP resolved, denies, dizziness, Palpitations , N/V/D, abdominal pain  pt afebrile, saturating in %on RA , desaturated on RA to 79 on Ambulation     MEDICATIONS  (STANDING):  dexAMETHasone  Injectable 6 milliGRAM(s) IV Push every 24 hours  enoxaparin Injectable 40 milliGRAM(s) SubCutaneous at bedtime  remdesivir  IVPB   IV Intermittent   remdesivir  IVPB 100 milliGRAM(s) IV Intermittent every 24 hours    MEDICATIONS  (PRN):  acetaminophen   Tablet .. 975 milliGRAM(s) Oral every 8 hours PRN Temp greater or equal to 38C (100.4F)  guaifenesin/dextromethorphan  Syrup 10 milliLiter(s) Oral every 4 hours PRN Cough  ondansetron Injectable 4 milliGRAM(s) IV Push every 4 hours PRN Nausea and/or Vomiting      Vital Signs Last 24 Hrs  T(C): 36.9 (15 Dec 2020 05:00), Max: 37.3 (14 Dec 2020 21:13)  T(F): 98.4 (15 Dec 2020 05:00), Max: 99.2 (14 Dec 2020 21:13)  HR: 78 (15 Dec 2020 05:00) (60 - 78)  BP: 101/56 (15 Dec 2020 05:00) (101/56 - 107/54)  BP(mean): --  RR: 20 (15 Dec 2020 05:00) (19 - 20)  SpO2: 95% (15 Dec 2020 05:00) (79% - 100%)  y      PHYSICAL EXAM:  GENERAL: NAD, well developed   HEAD:  Atraumatic, Normocephalic  EYES: EOMI, PERRLA, conjunctiva and sclera clear  CHEST/LUNG: Clear to auscultation bilaterally; No wheeze  HEART: Regular rate and rhythm;   ABDOMEN: Soft, Nontender, Nondistended; Bowel sounds present  EXTREMITIES:  2+ Peripheral Pulses, No clubbing, cyanosis, or edema  NEUROLOGY: non-focal, AAOX3  SKIN: No rashes or lesions      LABS:                        10.9   5.32  )-----------( 158      ( 14 Dec 2020 07:20 )             34.1     12-14    x   |  x   |  x   ----------------------------<  x   x    |  x   |  0.97    Ca    8.1<L>      14 Dec 2020 07:20    TPro  6.9  /  Alb  3.7  /  TBili  0.3  /  DBili  <0.2  /  AST  52<H>  /  ALT  46<H>  /  AlkPhos  65  12-14              RADIOLOGY & ADDITIONAL TESTS:    Imaging Personally Reviewed:    Consultant(s) Notes Reviewed:  ID     Care Discussed with Consultants/Other Providers: ID

## 2020-12-15 NOTE — PROGRESS NOTE ADULT - PROBLEM SELECTOR PLAN 1
1 out of 2 sets noted to have Staph hominis. Suspect contaminant. Noted mild fever but can be attributed to COVID-19 infection. Pt remains hemodynamically stable and with normal WBC  - repeat blood cx  NGTD   - defer further abx for now   -ID eval appreciated 1 out of 2 sets noted to have Staph hominis. Suspect contaminant. Noted mild fever but can be attributed to COVID-19 infection. Pt remains hemodynamically stable and with normal WBC  - repeat blood cx  NGTD   - defer further abx for now   -ID  f/u  appreciated

## 2020-12-15 NOTE — PROGRESS NOTE ADULT - ASSESSMENT
61 yo obese F with HLD presents with new shortness of breath in setting of recent COVID diagnosis as OP with fever, cough, COVID PNA, hypoxia, transaminitis

## 2020-12-15 NOTE — PROGRESS NOTE ADULT - ATTENDING COMMENTS
Shade Howell  Attending Physician   Division of Infectious Disease  Pager #[phone]  After 5pm/weekend or no response, call #[phone]

## 2020-12-15 NOTE — PROGRESS NOTE ADULT - PROBLEM SELECTOR PLAN 2
COVID PCR + on 12/7 (as OP) and 12/11 ([facility]).  CXR with hazy opacities.  pt symptomatic. In ED given decadron 6 mg x1.  - Pt saturating 93-97% on 2L NC   -ID eval appreciated, recommend staring Remdesivir and dexamethasone    -Monitor inflammatory markers, LFTs and renal fn closely   - c/w supportive care with cough suppressants and albuterol MDIs  - PRN Tylenol for  body aches and headache. COVID PCR + on 12/7 (as OP) and 12/11 ([facility]).  CXR with hazy opacities.  pt symptomatic. In ED given decadron 6 mg x1.  - Pt saturating  % on 2L NC   -ID eval appreciated, recommend staring Remdesivir and dexamethasone    -Monitor inflammatory markers, LFTs and renal fn closely   - c/w supportive care with cough suppressants and albuterol MDIs  - PRN Tylenol for  body aches and headache.

## 2020-12-15 NOTE — PROGRESS NOTE ADULT - SUBJECTIVE AND OBJECTIVE BOX
ABIGAIL LI 60y [id number]    Patient is a 60y old  Female who presents with a chief complaint of SOB due to COVID PNA (15 Dec 2020 10:34)      Follow Up/CC:  ID following for COVID    Interval History/ROS: no fever, on NC, slightly better    Allergies    No Known Allergies    Intolerances        ANTIMICROBIALS:  remdesivir  IVPB    remdesivir  IVPB 100 every 24 hours      MEDICATIONS  (STANDING):  dexAMETHasone  Injectable 6 milliGRAM(s) IV Push every 24 hours  enoxaparin Injectable 40 milliGRAM(s) SubCutaneous at bedtime  remdesivir  IVPB   IV Intermittent   remdesivir  IVPB 100 milliGRAM(s) IV Intermittent every 24 hours    MEDICATIONS  (PRN):  acetaminophen   Tablet .. 975 milliGRAM(s) Oral every 8 hours PRN Temp greater or equal to 38C (100.4F)  guaifenesin/dextromethorphan  Syrup 10 milliLiter(s) Oral every 4 hours PRN Cough  ondansetron Injectable 4 milliGRAM(s) IV Push every 4 hours PRN Nausea and/or Vomiting        Vital Signs Last 24 Hrs  T(C): 37.1 (15 Dec 2020 11:22), Max: 37.3 (14 Dec 2020 21:13)  T(F): 98.8 (15 Dec 2020 11:22), Max: 99.2 (14 Dec 2020 21:13)  HR: 70 (15 Dec 2020 11:22) (60 - 78)  BP: 125/64 (15 Dec 2020 11:22) (101/56 - 125/64)  BP(mean): --  RR: 20 (15 Dec 2020 11:22) (19 - 20)  SpO2: 93% (15 Dec 2020 11:22) (79% - 100%)    CBC Full  -  ( 14 Dec 2020 07:20 )  WBC Count : 5.32 K/uL  RBC Count : 3.89 M/uL  Hemoglobin : 10.9 g/dL  Hematocrit : 34.1 %  Platelet Count - Automated : 158 K/uL  Mean Cell Volume : 87.7 fL  Mean Cell Hemoglobin : 28.0 pg  Mean Cell Hemoglobin Concentration : 32.0 gm/dL  Auto Neutrophil # : x  Auto Lymphocyte # : x  Auto Monocyte # : x  Auto Eosinophil # : x  Auto Basophil # : x  Auto Neutrophil % : x  Auto Lymphocyte % : x  Auto Monocyte % : x  Auto Eosinophil % : x  Auto Basophil % : x    12-14    x   |  x   |  x   ----------------------------<  x   x    |  x   |  0.97    Ca    8.1<L>      14 Dec 2020 07:20    TPro  6.9  /  Alb  3.7  /  TBili  0.3  /  DBili  <0.2  /  AST  52<H>  /  ALT  46<H>  /  AlkPhos  65  12-14    LIVER FUNCTIONS - ( 14 Dec 2020 17:17 )  Alb: 3.7 g/dL / Pro: 6.9 g/dL / ALK PHOS: 65 U/L / ALT: 46 U/L / AST: 52 U/L / GGT: x           Procalcitonin, Serum: 0.10 (12-14)  Procalcitonin, Serum: 0.08 (12-11)    C-Reactive Protein, Serum: 87.0 (12-14)  C-Reactive Protein, Serum: 31.3 (12-11)    Ferritin, Serum: 448 (12-14)  Ferritin, Serum: 486 (12-11)      D-Dimer Assay, Quantitative: 765 (12-14)  D-Dimer Assay, Quantitative: 558 (12-12)  D-Dimer Assay, Quantitative: 502 (12-11)        MICROBIOLOGY:  .Blood Blood  12-13-20   No growth to date.  --  --      .Blood Blood-Peripheral  12-11-20   No growth to date.  --  --      .Blood Blood-Peripheral  12-11-20   Growth in aerobic bottle: Staphylococcus hominis  Coag Negative Staphylococcus  Single set isolate, possible contaminant. Contact  Microbiology if susceptibility testing clinically  indicated.  "Due to technical problems, Proteus sp. will Not be reported as part of  the BCID panel until further notice"  ***Blood Panel PCR results on this specimen are available  approximately 3 hours after the Gram stain result.***  Gram stain, PCR, and/or culture results may not always  correspond due to difference in methodologies.  ************************************************************  This PCR assay was performed using [employer].  The following targets are tested for: Enterococcus,  vancomycin resistant enterococci, Listeria monocytogenes,  coagulase negative staphylococci, S. aureus,  methicillin resistant S. aureus, Streptococcus agalactiae  (Group B), S. pneumoniae, S. pyogenes (Group A),  Acinetobacter baumannii, Enterobacter cloacae, E. coli,  Klebsiella oxytoca, K. pneumoniae, Proteus sp.,  Serratia marcescens, Haemophilus influenzae,  Neisseria meningitidis, Pseudomonas aeruginosa, Candida  albicans, C. glabrata, C krusei, C parapsilosis,  C. tropicalis and the KPC resistance gene.  --  Blood Culture PCR      Rapid RVP Result: Detected (12-11 @ 11:39)          RADIOLOGY    < from: Xray Chest 1 View- PORTABLE-Urgent (Xray Chest 1 View- PORTABLE-Urgent .) (12.11.20 @ 12:08) >  Vague increased markings and wispy hazy groundglass opacities suggested in peripheral left midlung and peripheral right lower lung suspicious for evolving multifocal infectious process including from potential atypical viral etiologies, follow-up suggested otherwise chest CT would be helpful for more definitive evaluation. No pleural effusions or pneumothorax.    Heart size and mediastinal width inaccurately assessed on the projection.    Trachea midline.    Unremarkable osseous structures.    < end of copied text >

## 2020-12-16 LAB
ALBUMIN SERPL ELPH-MCNC: 3.7 G/DL — SIGNIFICANT CHANGE UP (ref 3.3–5)
ALP SERPL-CCNC: 68 U/L — SIGNIFICANT CHANGE UP (ref 40–120)
ALT FLD-CCNC: 37 U/L — HIGH (ref 4–33)
ANION GAP SERPL CALC-SCNC: 12 MMOL/L — SIGNIFICANT CHANGE UP (ref 7–14)
AST SERPL-CCNC: 30 U/L — SIGNIFICANT CHANGE UP (ref 4–32)
BILIRUB DIRECT SERPL-MCNC: <0.2 MG/DL — SIGNIFICANT CHANGE UP (ref 0–0.2)
BILIRUB INDIRECT FLD-MCNC: >0 MG/DL — SIGNIFICANT CHANGE UP (ref 0–1)
BILIRUB SERPL-MCNC: 0.2 MG/DL — SIGNIFICANT CHANGE UP (ref 0.2–1.2)
BUN SERPL-MCNC: 16 MG/DL — SIGNIFICANT CHANGE UP (ref 7–23)
CALCIUM SERPL-MCNC: 8.9 MG/DL — SIGNIFICANT CHANGE UP (ref 8.4–10.5)
CHLORIDE SERPL-SCNC: 104 MMOL/L — SIGNIFICANT CHANGE UP (ref 98–107)
CO2 SERPL-SCNC: 22 MMOL/L — SIGNIFICANT CHANGE UP (ref 22–31)
CREAT SERPL-MCNC: 0.71 MG/DL — SIGNIFICANT CHANGE UP (ref 0.5–1.3)
CREAT SERPL-MCNC: 0.74 MG/DL — SIGNIFICANT CHANGE UP (ref 0.5–1.3)
CULTURE RESULTS: SIGNIFICANT CHANGE UP
GLUCOSE SERPL-MCNC: 139 MG/DL — HIGH (ref 70–99)
HCT VFR BLD CALC: 38.1 % — SIGNIFICANT CHANGE UP (ref 34.5–45)
HGB BLD-MCNC: 12.4 G/DL — SIGNIFICANT CHANGE UP (ref 11.5–15.5)
MCHC RBC-ENTMCNC: 27.4 PG — SIGNIFICANT CHANGE UP (ref 27–34)
MCHC RBC-ENTMCNC: 32.5 GM/DL — SIGNIFICANT CHANGE UP (ref 32–36)
MCV RBC AUTO: 84.1 FL — SIGNIFICANT CHANGE UP (ref 80–100)
NRBC # BLD: 0 /100 WBCS — SIGNIFICANT CHANGE UP
NRBC # FLD: 0 K/UL — SIGNIFICANT CHANGE UP
PLATELET # BLD AUTO: 247 K/UL — SIGNIFICANT CHANGE UP (ref 150–400)
POTASSIUM SERPL-MCNC: 4.3 MMOL/L — SIGNIFICANT CHANGE UP (ref 3.5–5.3)
POTASSIUM SERPL-SCNC: 4.3 MMOL/L — SIGNIFICANT CHANGE UP (ref 3.5–5.3)
PROT SERPL-MCNC: 7.6 G/DL — SIGNIFICANT CHANGE UP (ref 6–8.3)
RBC # BLD: 4.53 M/UL — SIGNIFICANT CHANGE UP (ref 3.8–5.2)
RBC # FLD: 13.5 % — SIGNIFICANT CHANGE UP (ref 10.3–14.5)
SODIUM SERPL-SCNC: 138 MMOL/L — SIGNIFICANT CHANGE UP (ref 135–145)
SPECIMEN SOURCE: SIGNIFICANT CHANGE UP
WBC # BLD: 4.58 K/UL — SIGNIFICANT CHANGE UP (ref 3.8–10.5)
WBC # FLD AUTO: 4.58 K/UL — SIGNIFICANT CHANGE UP (ref 3.8–10.5)

## 2020-12-16 PROCEDURE — 99232 SBSQ HOSP IP/OBS MODERATE 35: CPT

## 2020-12-16 PROCEDURE — 99233 SBSQ HOSP IP/OBS HIGH 50: CPT

## 2020-12-16 RX ADMIN — Medication 30 MILLILITER(S): at 11:16

## 2020-12-16 RX ADMIN — REMDESIVIR 500 MILLIGRAM(S): 5 INJECTION INTRAVENOUS at 13:57

## 2020-12-16 RX ADMIN — ONDANSETRON 4 MILLIGRAM(S): 8 TABLET, FILM COATED ORAL at 16:57

## 2020-12-16 RX ADMIN — ENOXAPARIN SODIUM 40 MILLIGRAM(S): 100 INJECTION SUBCUTANEOUS at 22:54

## 2020-12-16 RX ADMIN — Medication 6 MILLIGRAM(S): at 16:43

## 2020-12-16 RX ADMIN — Medication 10 MILLILITER(S): at 05:22

## 2020-12-16 NOTE — PROGRESS NOTE ADULT - SUBJECTIVE AND OBJECTIVE BOX
ABIGAIL LI 60y [id number]    Patient is a 60y old  Female who presents with a chief complaint of SOB due to COVID PNA (16 Dec 2020 08:49)      Follow Up/CC:  ID following for COVID    Interval History/ROS: cough with walking, no fever    Allergies    No Known Allergies    Intolerances        ANTIMICROBIALS:  remdesivir  IVPB 100 every 24 hours  remdesivir  IVPB        MEDICATIONS  (STANDING):  dexAMETHasone  Injectable 6 milliGRAM(s) IV Push every 24 hours  enoxaparin Injectable 40 milliGRAM(s) SubCutaneous at bedtime  remdesivir  IVPB 100 milliGRAM(s) IV Intermittent every 24 hours  remdesivir  IVPB   IV Intermittent     MEDICATIONS  (PRN):  acetaminophen   Tablet .. 975 milliGRAM(s) Oral every 8 hours PRN Temp greater or equal to 38C (100.4F)  aluminum hydroxide/magnesium hydroxide/simethicone Suspension 30 milliLiter(s) Oral every 4 hours PRN Dyspepsia  guaifenesin/dextromethorphan  Syrup 10 milliLiter(s) Oral every 4 hours PRN Cough  ondansetron Injectable 4 milliGRAM(s) IV Push every 4 hours PRN Nausea and/or Vomiting        Vital Signs Last 24 Hrs  T(C): 36.6 (16 Dec 2020 13:55), Max: 36.9 (15 Dec 2020 21:20)  T(F): 97.9 (16 Dec 2020 13:55), Max: 98.4 (15 Dec 2020 21:20)  HR: 60 (16 Dec 2020 13:55) (60 - 65)  BP: 109/58 (16 Dec 2020 13:55) (109/58 - 116/61)  BP(mean): --  RR: 18 (16 Dec 2020 13:55) (18 - 20)  SpO2: 97% (16 Dec 2020 13:55) (94% - 98%)    CBC Full  -  ( 16 Dec 2020 07:47 )  WBC Count : 4.58 K/uL  RBC Count : 4.53 M/uL  Hemoglobin : 12.4 g/dL  Hematocrit : 38.1 %  Platelet Count - Automated : 247 K/uL  Mean Cell Volume : 84.1 fL  Mean Cell Hemoglobin : 27.4 pg  Mean Cell Hemoglobin Concentration : 32.5 gm/dL  Auto Neutrophil # : x  Auto Lymphocyte # : x  Auto Monocyte # : x  Auto Eosinophil # : x  Auto Basophil # : x  Auto Neutrophil % : x  Auto Lymphocyte % : x  Auto Monocyte % : x  Auto Eosinophil % : x  Auto Basophil % : x    12-16    138  |  104  |  16  ----------------------------<  139<H>  4.3   |  22  |  0.71    Ca    8.9      16 Dec 2020 07:47    TPro  7.6  /  Alb  3.7  /  TBili  0.2  /  DBili  <0.2  /  AST  30  /  ALT  37<H>  /  AlkPhos  68  12-16    LIVER FUNCTIONS - ( 16 Dec 2020 07:47 )  Alb: 3.7 g/dL / Pro: 7.6 g/dL / ALK PHOS: 68 U/L / ALT: 37 U/L / AST: 30 U/L / GGT: x               MICROBIOLOGY:  .Blood Blood  12-13-20   No growth to date.  --  --      .Blood Blood-Peripheral  12-11-20   No Growth Final  --  --      .Blood Blood-Peripheral  12-11-20   Growth in aerobic bottle: Staphylococcus hominis  Coag Negative Staphylococcus  Single set isolate, possible contaminant. Contact  Microbiology if susceptibility testing clinically  indicated.  "Due to technical problems, Proteus sp. will Not be reported as part of  the BCID panel until further notice"  ***Blood Panel PCR results on this specimen are available  approximately 3 hours after the Gram stain result.***  Gram stain, PCR, and/or culture results may not always  correspond due to difference in methodologies.  ************************************************************  This PCR assay was performed using [employer].  The following targets are tested for: Enterococcus,  vancomycin resistant enterococci, Listeria monocytogenes,  coagulase negative staphylococci, S. aureus,  methicillin resistant S. aureus, Streptococcus agalactiae  (Group B), S. pneumoniae, S. pyogenes (Group A),  Acinetobacter baumannii, Enterobacter cloacae, E. coli,  Klebsiella oxytoca, K. pneumoniae, Proteus sp.,  Serratia marcescens, Haemophilus influenzae,  Neisseria meningitidis, Pseudomonas aeruginosa, Candida  albicans, C. glabrata, C krusei, C parapsilosis,  C. tropicalis and the KPC resistance gene.  --  Blood Culture PCR        Rapid RVP Result: Detected (12-11 @ 11:39)      RADIOLOGY    < from: Xray Chest 1 View- PORTABLE-Urgent (Xray Chest 1 View- PORTABLE-Urgent .) (12.11.20 @ 12:08) >  Vague increased markings and wispy hazy groundglass opacities suggested in peripheral left midlung and peripheral right lower lung suspicious for evolving multifocal infectious process including from potential atypical viral etiologies, follow-up suggested otherwise chest CT would be helpful for more definitive evaluation. No pleural effusions or pneumothorax.    Heart size and mediastinal width inaccurately assessed on the projection.    Trachea midline.    Unremarkable osseous structures.    < end of copied text >

## 2020-12-16 NOTE — PROGRESS NOTE ADULT - PROBLEM SELECTOR PLAN 2
COVID PCR + on 12/7 (as OP) and 12/11 ([facility]).  CXR with hazy opacities.  pt symptomatic. In ED given decadron 6 mg x1.  - Pt saturating  % on 2L NC   -ID eval appreciated, recommend staring Remdesivir and dexamethasone    -Monitor inflammatory markers, LFTs and renal fn closely   - c/w supportive care with cough suppressants and albuterol MDIs  - PRN Tylenol for  body aches and headache. COVID PCR + on 12/7 (as OP) and 12/11 ([facility]).  CXR with hazy opacities.  pt symptomatic. In ED given decadron 6 mg x1.  - Pt saturating  % on 2L NC   -ID eval appreciated, recommend staring Remdesivir and dexamethasone    -c/w Remdesivir day3/5 and [id number] day3/10   -Monitor inflammatory markers, LFTs and renal fn closely   - c/w supportive care with cough suppressants and albuterol MDIs  - PRN Tylenol for  body aches and headache.

## 2020-12-16 NOTE — PROGRESS NOTE ADULT - ASSESSMENT
Ms. Ororuke is a 59 yo woman with a medical hx notable for obesity and HLD admitted for monitoring of respiratory status in the setting of COVID PNA. Pt symptomatic but remains with adequate oxygenation on room air. Noted positive blood cx in 1 of 2 sets s/p dose of Vancomycin but likely contaminant since noted to be coag negative staph.

## 2020-12-16 NOTE — PROGRESS NOTE ADULT - ASSESSMENT
59 yo obese F with HLD presents with new shortness of breath in setting of recent COVID diagnosis as OP with fever, cough, COVID PNA, hypoxia, transaminitis

## 2020-12-16 NOTE — PROGRESS NOTE ADULT - ATTENDING COMMENTS
Shade Howell  Attending Physician   Division of Infectious Disease  Pager #[phone]  After 5pm/weekend or no response, call #[phone]

## 2020-12-16 NOTE — PROGRESS NOTE ADULT - SUBJECTIVE AND OBJECTIVE BOX
Patient is a 60y old  Female who presents with a chief complaint of SOB due to COVID PNA (15 Dec 2020 11:24)      SUBJECTIVE / OVERNIGHT EVENTS:    MEDICATIONS  (STANDING):  dexAMETHasone  Injectable 6 milliGRAM(s) IV Push every 24 hours  enoxaparin Injectable 40 milliGRAM(s) SubCutaneous at bedtime  remdesivir  IVPB   IV Intermittent   remdesivir  IVPB 100 milliGRAM(s) IV Intermittent every 24 hours    MEDICATIONS  (PRN):  acetaminophen   Tablet .. 975 milliGRAM(s) Oral every 8 hours PRN Temp greater or equal to 38C (100.4F)  guaifenesin/dextromethorphan  Syrup 10 milliLiter(s) Oral every 4 hours PRN Cough  ondansetron Injectable 4 milliGRAM(s) IV Push every 4 hours PRN Nausea and/or Vomiting      Vital Signs Last 24 Hrs  T(C): 36.7 (16 Dec 2020 05:13), Max: 37.1 (15 Dec 2020 11:22)  T(F): 98 (16 Dec 2020 05:13), Max: 98.8 (15 Dec 2020 11:22)  HR: 63 (16 Dec 2020 05:13) (52 - 70)  BP: 116/61 (16 Dec 2020 05:13) (115/62 - 125/64)  BP(mean): --  RR: 20 (16 Dec 2020 05:13) (20 - 20)  SpO2: 94% (16 Dec 2020 05:13) (93% - 98%)  CAPILLARY BLOOD GLUCOSE        I&O's Summary    15 Dec 2020 07:01  -  16 Dec 2020 07:00  --------------------------------------------------------  IN: 100 mL / OUT: 0 mL / NET: 100 mL        PHYSICAL EXAM:  GENERAL: NAD, well-developed  HEAD:  Atraumatic, Normocephalic  EYES: EOMI, PERRLA, conjunctiva and sclera clear  NECK: Supple, No JVD  CHEST/LUNG: Clear to auscultation bilaterally; No wheeze  HEART: Regular rate and rhythm; No murmurs, rubs, or gallops  ABDOMEN: Soft, Nontender, Nondistended; Bowel sounds present  EXTREMITIES:  2+ Peripheral Pulses, No clubbing, cyanosis, or edema  PSYCH: AAOx3  NEUROLOGY: non-focal  SKIN: No rashes or lesions    LABS:                        12.4   4.58  )-----------( 247      ( 16 Dec 2020 07:47 )             38.1     12-16    138  |  104  |  16  ----------------------------<  139<H>  4.3   |  22  |  0.71    Ca    8.9      16 Dec 2020 07:47    TPro  7.6  /  Alb  3.7  /  TBili  0.2  /  DBili  <0.2  /  AST  30  /  ALT  37<H>  /  AlkPhos  68  12-16              RADIOLOGY & ADDITIONAL TESTS:    Imaging Personally Reviewed:    Consultant(s) Notes Reviewed:      Care Discussed with Consultants/Other Providers:   Patient is a 60y old  Female who presents with a chief complaint of SOB due to COVID PNA (15 Dec 2020 11:24)      SUBJECTIVE / OVERNIGHT EVENTS: patient seen and examined by bedside, c/o feeling, weak, tired, and dizziness with walking to bathroom, also has cough,   pt afebrile, saturating in 90s on 2 L NC     MEDICATIONS  (STANDING):  dexAMETHasone  Injectable 6 milliGRAM(s) IV Push every 24 hours  enoxaparin Injectable 40 milliGRAM(s) SubCutaneous at bedtime  remdesivir  IVPB   IV Intermittent   remdesivir  IVPB 100 milliGRAM(s) IV Intermittent every 24 hours    MEDICATIONS  (PRN):  acetaminophen   Tablet .. 975 milliGRAM(s) Oral every 8 hours PRN Temp greater or equal to 38C (100.4F)  guaifenesin/dextromethorphan  Syrup 10 milliLiter(s) Oral every 4 hours PRN Cough  ondansetron Injectable 4 milliGRAM(s) IV Push every 4 hours PRN Nausea and/or Vomiting      Vital Signs Last 24 Hrs  T(C): 36.7 (16 Dec 2020 05:13), Max: 37.1 (15 Dec 2020 11:22)  T(F): 98 (16 Dec 2020 05:13), Max: 98.8 (15 Dec 2020 11:22)  HR: 63 (16 Dec 2020 05:13) (52 - 70)  BP: 116/61 (16 Dec 2020 05:13) (115/62 - 125/64)  BP(mean): --  RR: 20 (16 Dec 2020 05:13) (20 - 20)  SpO2: 94% (16 Dec 2020 05:13) (93% - 98%)  CAPILLARY BLOOD GLUCOSE        I&O's Summary    15 Dec 2020 07:01  -  16 Dec 2020 07:00  --------------------------------------------------------  IN: 100 mL / OUT: 0 mL / NET: 100 mL    PHYSICAL EXAM:  GENERAL: NAD, well developed   HEAD:  Atraumatic, Normocephalic  EYES: EOMI, PERRLA, conjunctiva and sclera clear  CHEST/LUNG: Clear to auscultation bilaterally; No wheeze  HEART: Regular rate and rhythm;   ABDOMEN: Soft, Nontender, Nondistended; Bowel sounds present  EXTREMITIES:  2+ Peripheral Pulses, No clubbing, cyanosis, or edema  NEUROLOGY: non-focal, AAOX3  SKIN: No rashes or lesions        LABS:                        12.4   4.58  )-----------( 247      ( 16 Dec 2020 07:47 )             38.1     12-16    138  |  104  |  16  ----------------------------<  139<H>  4.3   |  22  |  0.71    Ca    8.9      16 Dec 2020 07:47    TPro  7.6  /  Alb  3.7  /  TBili  0.2  /  DBili  <0.2  /  AST  30  /  ALT  37<H>  /  AlkPhos  68  12-16              RADIOLOGY & ADDITIONAL TESTS:    Imaging Personally Reviewed:    Consultant(s) Notes Reviewed:  ID     Care Discussed with Consultants/Other Providers:

## 2020-12-16 NOTE — PROGRESS NOTE ADULT - PROBLEM SELECTOR PLAN 1
-supportive care  -maintain oxygenation saturation  -monitor fever curve  -continue airborne/contact isolation  -cont dexamethasone 6 mg daily  -cont remdesevir 100 mg iv q24  -monitor pulse ox  -contact/airborne precautions   -wean O2 as tolerated  -trend inflammatory markers

## 2020-12-16 NOTE — PROGRESS NOTE ADULT - PROBLEM SELECTOR PLAN 1
1 out of 2 sets noted to have Staph hominis. Suspect contaminant. Noted mild fever but can be attributed to COVID-19 infection. Pt remains hemodynamically stable and with normal WBC  - repeat blood cx  NGTD   - defer further abx for now   -ID  f/u  appreciated

## 2020-12-17 LAB
ALBUMIN SERPL ELPH-MCNC: 3.3 G/DL — SIGNIFICANT CHANGE UP (ref 3.3–5)
ALP SERPL-CCNC: 67 U/L — SIGNIFICANT CHANGE UP (ref 40–120)
ALT FLD-CCNC: 32 U/L — SIGNIFICANT CHANGE UP (ref 4–33)
ANION GAP SERPL CALC-SCNC: 13 MMOL/L — SIGNIFICANT CHANGE UP (ref 7–14)
AST SERPL-CCNC: 27 U/L — SIGNIFICANT CHANGE UP (ref 4–32)
BILIRUB DIRECT SERPL-MCNC: <0.2 MG/DL — SIGNIFICANT CHANGE UP (ref 0–0.2)
BILIRUB INDIRECT FLD-MCNC: >0.1 MG/DL — SIGNIFICANT CHANGE UP (ref 0–1)
BILIRUB SERPL-MCNC: 0.3 MG/DL — SIGNIFICANT CHANGE UP (ref 0.2–1.2)
BUN SERPL-MCNC: 17 MG/DL — SIGNIFICANT CHANGE UP (ref 7–23)
CALCIUM SERPL-MCNC: 9 MG/DL — SIGNIFICANT CHANGE UP (ref 8.4–10.5)
CHLORIDE SERPL-SCNC: 105 MMOL/L — SIGNIFICANT CHANGE UP (ref 98–107)
CO2 SERPL-SCNC: 22 MMOL/L — SIGNIFICANT CHANGE UP (ref 22–31)
CREAT SERPL-MCNC: 0.73 MG/DL — SIGNIFICANT CHANGE UP (ref 0.5–1.3)
CREAT SERPL-MCNC: 0.73 MG/DL — SIGNIFICANT CHANGE UP (ref 0.5–1.3)
CRP SERPL-MCNC: 28.1 MG/L — HIGH
D DIMER BLD IA.RAPID-MCNC: 2718 NG/ML DDU — HIGH
FERRITIN SERPL-MCNC: 373 NG/ML — HIGH (ref 15–150)
GLUCOSE SERPL-MCNC: 138 MG/DL — HIGH (ref 70–99)
HCT VFR BLD CALC: 37.8 % — SIGNIFICANT CHANGE UP (ref 34.5–45)
HGB BLD-MCNC: 12 G/DL — SIGNIFICANT CHANGE UP (ref 11.5–15.5)
LDH SERPL L TO P-CCNC: 425 U/L — HIGH (ref 135–225)
MCHC RBC-ENTMCNC: 27.2 PG — SIGNIFICANT CHANGE UP (ref 27–34)
MCHC RBC-ENTMCNC: 31.7 GM/DL — LOW (ref 32–36)
MCV RBC AUTO: 85.7 FL — SIGNIFICANT CHANGE UP (ref 80–100)
NRBC # BLD: 0 /100 WBCS — SIGNIFICANT CHANGE UP
NRBC # FLD: 0 K/UL — SIGNIFICANT CHANGE UP
PLATELET # BLD AUTO: 251 K/UL — SIGNIFICANT CHANGE UP (ref 150–400)
POTASSIUM SERPL-MCNC: 4.9 MMOL/L — SIGNIFICANT CHANGE UP (ref 3.5–5.3)
POTASSIUM SERPL-SCNC: 4.9 MMOL/L — SIGNIFICANT CHANGE UP (ref 3.5–5.3)
PROCALCITONIN SERPL-MCNC: 0.03 NG/ML — SIGNIFICANT CHANGE UP (ref 0.02–0.1)
PROT SERPL-MCNC: 7.2 G/DL — SIGNIFICANT CHANGE UP (ref 6–8.3)
RBC # BLD: 4.41 M/UL — SIGNIFICANT CHANGE UP (ref 3.8–5.2)
RBC # FLD: 13.6 % — SIGNIFICANT CHANGE UP (ref 10.3–14.5)
SODIUM SERPL-SCNC: 140 MMOL/L — SIGNIFICANT CHANGE UP (ref 135–145)
WBC # BLD: 4.98 K/UL — SIGNIFICANT CHANGE UP (ref 3.8–10.5)
WBC # FLD AUTO: 4.98 K/UL — SIGNIFICANT CHANGE UP (ref 3.8–10.5)

## 2020-12-17 PROCEDURE — 99233 SBSQ HOSP IP/OBS HIGH 50: CPT

## 2020-12-17 RX ORDER — LANOLIN ALCOHOL/MO/W.PET/CERES
3 CREAM (GRAM) TOPICAL AT BEDTIME
Refills: 0 | Status: DISCONTINUED | OUTPATIENT
Start: 2020-12-17 | End: 2020-12-29

## 2020-12-17 RX ORDER — ENOXAPARIN SODIUM 100 MG/ML
40 INJECTION SUBCUTANEOUS
Refills: 0 | Status: DISCONTINUED | OUTPATIENT
Start: 2020-12-17 | End: 2020-12-25

## 2020-12-17 RX ORDER — GUAIFENESIN/DEXTROMETHORPHAN 600MG-30MG
10 TABLET, EXTENDED RELEASE 12 HR ORAL EVERY 4 HOURS
Refills: 0 | Status: DISCONTINUED | OUTPATIENT
Start: 2020-12-17 | End: 2020-12-29

## 2020-12-17 RX ADMIN — ENOXAPARIN SODIUM 40 MILLIGRAM(S): 100 INJECTION SUBCUTANEOUS at 17:40

## 2020-12-17 RX ADMIN — ONDANSETRON 4 MILLIGRAM(S): 8 TABLET, FILM COATED ORAL at 06:39

## 2020-12-17 RX ADMIN — Medication 6 MILLIGRAM(S): at 15:06

## 2020-12-17 RX ADMIN — Medication 3 MILLIGRAM(S): at 22:32

## 2020-12-17 RX ADMIN — REMDESIVIR 500 MILLIGRAM(S): 5 INJECTION INTRAVENOUS at 15:07

## 2020-12-17 NOTE — PROGRESS NOTE ADULT - PROBLEM SELECTOR PLAN 5
IMPROVE Score 3 - Lovenox for DVT prevention, IMPROVE Score 3 - D-dimer worsening, on  Lovenox for DVT prevention, will increase Lovenox to BID ,

## 2020-12-17 NOTE — PROGRESS NOTE ADULT - PROBLEM SELECTOR PLAN 2
COVID PCR + on 12/7 (as OP) and 12/11 ([facility]).  CXR with hazy opacities.  pt symptomatic. In ED given decadron 6 mg x1.  - Pt saturating  % on 2L NC   -ID eval appreciated, recommend staring Remdesivir and dexamethasone    -c/w Remdesivir day3/5 and [id number] day3/10   -Monitor inflammatory markers, LFTs and renal fn closely   - c/w supportive care with cough suppressants and albuterol MDIs  - PRN Tylenol for  body aches and headache. COVID PCR + on 12/7 (as OP) and 12/11 ([facility]).  CXR with hazy opacities.  pt symptomatic. In ED given decadron 6 mg x1.  - Pt saturating  % on 3L NC   -ID eval appreciated, recommend staring Remdesivir and dexamethasone    -c/w Remdesivir day4/5 and [id number] day4/10   -Monitor inflammatory markers, LFTs and renal fn closely   - c/w supportive care with cough suppressants and albuterol MDIs  - PRN Tylenol for  body aches and headache.

## 2020-12-17 NOTE — PROGRESS NOTE ADULT - SUBJECTIVE AND OBJECTIVE BOX
Patient is a 60y old  Female who presents with a chief complaint of SOB due to COVID PNA (16 Dec 2020 16:00)      SUBJECTIVE / OVERNIGHT EVENTS:    MEDICATIONS  (STANDING):  dexAMETHasone  Injectable 6 milliGRAM(s) IV Push every 24 hours  enoxaparin Injectable 40 milliGRAM(s) SubCutaneous at bedtime  remdesivir  IVPB   IV Intermittent   remdesivir  IVPB 100 milliGRAM(s) IV Intermittent every 24 hours    MEDICATIONS  (PRN):  acetaminophen   Tablet .. 975 milliGRAM(s) Oral every 8 hours PRN Temp greater or equal to 38C (100.4F)  aluminum hydroxide/magnesium hydroxide/simethicone Suspension 30 milliLiter(s) Oral every 4 hours PRN Dyspepsia  guaifenesin/dextromethorphan  Syrup 10 milliLiter(s) Oral every 4 hours PRN Cough  melatonin 3 milliGRAM(s) Oral at bedtime PRN Insomnia  ondansetron Injectable 4 milliGRAM(s) IV Push every 4 hours PRN Nausea and/or Vomiting      Vital Signs Last 24 Hrs  T(C): 36.3 (17 Dec 2020 06:29), Max: 36.7 (16 Dec 2020 10:33)  T(F): 97.4 (17 Dec 2020 06:29), Max: 98 (16 Dec 2020 10:33)  HR: 55 (17 Dec 2020 06:29) (55 - 67)  BP: 113/64 (17 Dec 2020 06:29) (109/58 - 117/66)  BP(mean): --  RR: 18 (17 Dec 2020 06:29) (18 - 18)  SpO2: 96% (17 Dec 2020 06:29) (90% - 97%)  CAPILLARY BLOOD GLUCOSE        I&O's Summary      PHYSICAL EXAM:  GENERAL: NAD, well-developed  HEAD:  Atraumatic, Normocephalic  EYES: EOMI, PERRLA, conjunctiva and sclera clear  NECK: Supple, No JVD  CHEST/LUNG: Clear to auscultation bilaterally; No wheeze  HEART: Regular rate and rhythm; No murmurs, rubs, or gallops  ABDOMEN: Soft, Nontender, Nondistended; Bowel sounds present  EXTREMITIES:  2+ Peripheral Pulses, No clubbing, cyanosis, or edema  PSYCH: AAOx3  NEUROLOGY: non-focal  SKIN: No rashes or lesions    LABS:                        12.0   4.98  )-----------( 251      ( 17 Dec 2020 07:21 )             37.8     12-17    140  |  105  |  17  ----------------------------<  138<H>  4.9   |  22  |  0.73    Ca    9.0      17 Dec 2020 07:21    TPro  7.2  /  Alb  3.3  /  TBili  0.3  /  DBili  <0.2  /  AST  27  /  ALT  32  /  AlkPhos  67  12-17              RADIOLOGY & ADDITIONAL TESTS:    Imaging Personally Reviewed:    Consultant(s) Notes Reviewed:      Care Discussed with Consultants/Other Providers:   Patient is a 60y old  Female who presents with a chief complaint of SOB due to COVID PNA (16 Dec 2020 16:00)      SUBJECTIVE / OVERNIGHT EVENTS: patient seen and examined by bedside, sitting up in bed, feeling better than before, cough and SOB improving , reports mild dizziness upon  getting up, denies headache, CP, Palpitations , N/V/D, abdominal pain   pt on 3L NC, today, saturating in high 90s     MEDICATIONS  (STANDING):  dexAMETHasone  Injectable 6 milliGRAM(s) IV Push every 24 hours  enoxaparin Injectable 40 milliGRAM(s) SubCutaneous at bedtime  remdesivir  IVPB   IV Intermittent   remdesivir  IVPB 100 milliGRAM(s) IV Intermittent every 24 hours    MEDICATIONS  (PRN):  acetaminophen   Tablet .. 975 milliGRAM(s) Oral every 8 hours PRN Temp greater or equal to 38C (100.4F)  aluminum hydroxide/magnesium hydroxide/simethicone Suspension 30 milliLiter(s) Oral every 4 hours PRN Dyspepsia  guaifenesin/dextromethorphan  Syrup 10 milliLiter(s) Oral every 4 hours PRN Cough  melatonin 3 milliGRAM(s) Oral at bedtime PRN Insomnia  ondansetron Injectable 4 milliGRAM(s) IV Push every 4 hours PRN Nausea and/or Vomiting      Vital Signs Last 24 Hrs  T(C): 36.3 (17 Dec 2020 06:29), Max: 36.7 (16 Dec 2020 10:33)  T(F): 97.4 (17 Dec 2020 06:29), Max: 98 (16 Dec 2020 10:33)  HR: 55 (17 Dec 2020 06:29) (55 - 67)  BP: 113/64 (17 Dec 2020 06:29) (109/58 - 117/66)  BP(mean): --  RR: 18 (17 Dec 2020 06:29) (18 - 18)  SpO2: 96% (17 Dec 2020 06:29) (90% - 97%)      PHYSICAL EXAM:  GENERAL: NAD, well developed   HEAD:  Atraumatic, Normocephalic  EYES: EOMI, PERRLA, conjunctiva and sclera clear  CHEST/LUNG: Clear to auscultation bilaterally; No wheeze  HEART: Regular rate and rhythm;   ABDOMEN: Soft, Nontender, Nondistended; Bowel sounds present  EXTREMITIES:  2+ Peripheral Pulses, No clubbing, cyanosis, or edema  NEUROLOGY: non-focal, AAOX3  SKIN: No rashes or lesions        LABS:                        12.0   4.98  )-----------( 251      ( 17 Dec 2020 07:21 )             37.8     12-17    140  |  105  |  17  ----------------------------<  138<H>  4.9   |  22  |  0.73    Ca    9.0      17 Dec 2020 07:21    TPro  7.2  /  Alb  3.3  /  TBili  0.3  /  DBili  <0.2  /  AST  27  /  ALT  32  /  AlkPhos  67  12-17              RADIOLOGY & ADDITIONAL TESTS:    Imaging Personally Reviewed:    Consultant(s) Notes Reviewed:  ID     Care Discussed with Consultants/Other Providers:

## 2020-12-18 LAB
ALBUMIN SERPL ELPH-MCNC: 3.4 G/DL — SIGNIFICANT CHANGE UP (ref 3.3–5)
ALP SERPL-CCNC: 60 U/L — SIGNIFICANT CHANGE UP (ref 40–120)
ALT FLD-CCNC: 28 U/L — SIGNIFICANT CHANGE UP (ref 4–33)
AST SERPL-CCNC: 20 U/L — SIGNIFICANT CHANGE UP (ref 4–32)
BILIRUB DIRECT SERPL-MCNC: <0.2 MG/DL — SIGNIFICANT CHANGE UP (ref 0–0.2)
BILIRUB INDIRECT FLD-MCNC: >0.1 MG/DL — SIGNIFICANT CHANGE UP (ref 0–1)
BILIRUB SERPL-MCNC: 0.3 MG/DL — SIGNIFICANT CHANGE UP (ref 0.2–1.2)
CREAT SERPL-MCNC: 0.76 MG/DL — SIGNIFICANT CHANGE UP (ref 0.5–1.3)
CULTURE RESULTS: SIGNIFICANT CHANGE UP
CULTURE RESULTS: SIGNIFICANT CHANGE UP
D DIMER BLD IA.RAPID-MCNC: 2448 NG/ML DDU — HIGH
HCT VFR BLD CALC: 36.5 % — SIGNIFICANT CHANGE UP (ref 34.5–45)
HGB BLD-MCNC: 12 G/DL — SIGNIFICANT CHANGE UP (ref 11.5–15.5)
MCHC RBC-ENTMCNC: 27.5 PG — SIGNIFICANT CHANGE UP (ref 27–34)
MCHC RBC-ENTMCNC: 32.9 GM/DL — SIGNIFICANT CHANGE UP (ref 32–36)
MCV RBC AUTO: 83.7 FL — SIGNIFICANT CHANGE UP (ref 80–100)
NRBC # BLD: 0 /100 WBCS — SIGNIFICANT CHANGE UP
NRBC # FLD: 0 K/UL — SIGNIFICANT CHANGE UP
PLATELET # BLD AUTO: 271 K/UL — SIGNIFICANT CHANGE UP (ref 150–400)
PROT SERPL-MCNC: 6.8 G/DL — SIGNIFICANT CHANGE UP (ref 6–8.3)
RBC # BLD: 4.36 M/UL — SIGNIFICANT CHANGE UP (ref 3.8–5.2)
RBC # FLD: 13.4 % — SIGNIFICANT CHANGE UP (ref 10.3–14.5)
SPECIMEN SOURCE: SIGNIFICANT CHANGE UP
SPECIMEN SOURCE: SIGNIFICANT CHANGE UP
WBC # BLD: 5.38 K/UL — SIGNIFICANT CHANGE UP (ref 3.8–10.5)
WBC # FLD AUTO: 5.38 K/UL — SIGNIFICANT CHANGE UP (ref 3.8–10.5)

## 2020-12-18 PROCEDURE — 99232 SBSQ HOSP IP/OBS MODERATE 35: CPT

## 2020-12-18 RX ORDER — ACETAMINOPHEN 500 MG
650 TABLET ORAL EVERY 6 HOURS
Refills: 0 | Status: DISCONTINUED | OUTPATIENT
Start: 2020-12-18 | End: 2020-12-29

## 2020-12-18 RX ADMIN — Medication 650 MILLIGRAM(S): at 15:05

## 2020-12-18 RX ADMIN — REMDESIVIR 500 MILLIGRAM(S): 5 INJECTION INTRAVENOUS at 14:50

## 2020-12-18 RX ADMIN — Medication 650 MILLIGRAM(S): at 15:55

## 2020-12-18 RX ADMIN — Medication 6 MILLIGRAM(S): at 17:39

## 2020-12-18 RX ADMIN — ENOXAPARIN SODIUM 40 MILLIGRAM(S): 100 INJECTION SUBCUTANEOUS at 06:04

## 2020-12-18 RX ADMIN — ENOXAPARIN SODIUM 40 MILLIGRAM(S): 100 INJECTION SUBCUTANEOUS at 17:41

## 2020-12-18 RX ADMIN — Medication 10 MILLILITER(S): at 17:39

## 2020-12-18 RX ADMIN — Medication 3 MILLIGRAM(S): at 22:15

## 2020-12-18 RX ADMIN — Medication 10 MILLILITER(S): at 09:15

## 2020-12-18 NOTE — PROGRESS NOTE ADULT - SUBJECTIVE AND OBJECTIVE BOX
Patient is a 60y old  Female who presents with a chief complaint of SOB due to COVID PNA (17 Dec 2020 09:58)      SUBJECTIVE / OVERNIGHT EVENTS:    MEDICATIONS  (STANDING):  dexAMETHasone  Injectable 6 milliGRAM(s) IV Push every 24 hours  enoxaparin Injectable 40 milliGRAM(s) SubCutaneous two times a day  remdesivir  IVPB   IV Intermittent   remdesivir  IVPB 100 milliGRAM(s) IV Intermittent every 24 hours    MEDICATIONS  (PRN):  acetaminophen   Tablet .. 650 milliGRAM(s) Oral every 6 hours PRN Temp greater or equal to 38C (100.4F), Mild Pain (1 - 3), Moderate Pain (4 - 6)  aluminum hydroxide/magnesium hydroxide/simethicone Suspension 30 milliLiter(s) Oral every 4 hours PRN Dyspepsia  guaifenesin/dextromethorphan  Syrup 10 milliLiter(s) Oral every 4 hours PRN Cough  melatonin 3 milliGRAM(s) Oral at bedtime PRN Insomnia  ondansetron Injectable 4 milliGRAM(s) IV Push every 4 hours PRN Nausea and/or Vomiting      Vital Signs Last 24 Hrs  T(C): 36.4 (18 Dec 2020 06:00), Max: 36.6 (17 Dec 2020 22:34)  T(F): 97.5 (18 Dec 2020 06:00), Max: 97.8 (17 Dec 2020 22:34)  HR: 77 (17 Dec 2020 22:34) (64 - 77)  BP: 130/73 (18 Dec 2020 06:00) (106/59 - 130/73)  BP(mean): --  RR: 18 (18 Dec 2020 06:00) (18 - 20)  SpO2: 97% (18 Dec 2020 06:00) (93% - 97%)  CAPILLARY BLOOD GLUCOSE        I&O's Summary      PHYSICAL EXAM:  GENERAL: NAD, well-developed  HEAD:  Atraumatic, Normocephalic  EYES: EOMI, PERRLA, conjunctiva and sclera clear  NECK: Supple, No JVD  CHEST/LUNG: Clear to auscultation bilaterally; No wheeze  HEART: Regular rate and rhythm; No murmurs, rubs, or gallops  ABDOMEN: Soft, Nontender, Nondistended; Bowel sounds present  EXTREMITIES:  2+ Peripheral Pulses, No clubbing, cyanosis, or edema  PSYCH: AAOx3  NEUROLOGY: non-focal  SKIN: No rashes or lesions    LABS:                        12.0   5.38  )-----------( 271      ( 18 Dec 2020 07:34 )             36.5     12-18    x   |  x   |  x   ----------------------------<  x   x    |  x   |  0.76    Ca    9.0      17 Dec 2020 07:21    TPro  6.8  /  Alb  3.4  /  TBili  0.3  /  DBili  <0.2  /  AST  20  /  ALT  28  /  AlkPhos  60  12-18              RADIOLOGY & ADDITIONAL TESTS:    Imaging Personally Reviewed:    Consultant(s) Notes Reviewed:      Care Discussed with Consultants/Other Providers:   Patient is a 60y old  Female who presents with a chief complaint of SOB due to COVID PNA (17 Dec 2020 09:58)      SUBJECTIVE / OVERNIGHT EVENTS: patient seen and examined by bedside, feels better than before, cough and sob improving, has mild headache , dizziness resolved   pt saturating in high 90s on 3L NC       MEDICATIONS  (STANDING):  dexAMETHasone  Injectable 6 milliGRAM(s) IV Push every 24 hours  enoxaparin Injectable 40 milliGRAM(s) SubCutaneous two times a day  remdesivir  IVPB   IV Intermittent   remdesivir  IVPB 100 milliGRAM(s) IV Intermittent every 24 hours    MEDICATIONS  (PRN):  acetaminophen   Tablet .. 650 milliGRAM(s) Oral every 6 hours PRN Temp greater or equal to 38C (100.4F), Mild Pain (1 - 3), Moderate Pain (4 - 6)  aluminum hydroxide/magnesium hydroxide/simethicone Suspension 30 milliLiter(s) Oral every 4 hours PRN Dyspepsia  guaifenesin/dextromethorphan  Syrup 10 milliLiter(s) Oral every 4 hours PRN Cough  melatonin 3 milliGRAM(s) Oral at bedtime PRN Insomnia  ondansetron Injectable 4 milliGRAM(s) IV Push every 4 hours PRN Nausea and/or Vomiting      Vital Signs Last 24 Hrs  T(C): 36.4 (18 Dec 2020 06:00), Max: 36.6 (17 Dec 2020 22:34)  T(F): 97.5 (18 Dec 2020 06:00), Max: 97.8 (17 Dec 2020 22:34)  HR: 77 (17 Dec 2020 22:34) (64 - 77)  BP: 130/73 (18 Dec 2020 06:00) (106/59 - 130/73)  BP(mean): --  RR: 18 (18 Dec 2020 06:00) (18 - 20)  SpO2: 97% (18 Dec 2020 06:00) (93% - 97%)  CAPILLARY BLOOD GLUCOSE        I&O's Summary        PHYSICAL EXAM:  GENERAL: NAD, well developed   HEAD:  Atraumatic, Normocephalic  EYES: EOMI, PERRLA, conjunctiva and sclera clear  CHEST/LUNG: Clear to auscultation bilaterally; No wheeze  HEART: Regular rate and rhythm;   ABDOMEN: Soft, Nontender, Nondistended; Bowel sounds present  EXTREMITIES:  2+ Peripheral Pulses, No clubbing, cyanosis, or edema  NEUROLOGY: non-focal, AAOX3  SKIN: No rashes or lesions    LABS:                        12.0   5.38  )-----------( 271      ( 18 Dec 2020 07:34 )             36.5     12-18    x   |  x   |  x   ----------------------------<  x   x    |  x   |  0.76    Ca    9.0      17 Dec 2020 07:21    TPro  6.8  /  Alb  3.4  /  TBili  0.3  /  DBili  <0.2  /  AST  20  /  ALT  28  /  AlkPhos  60  12-18              RADIOLOGY & ADDITIONAL TESTS:    Imaging Personally Reviewed:    Consultant(s) Notes Reviewed:      Care Discussed with Consultants/Other Providers:

## 2020-12-18 NOTE — PHYSICAL THERAPY INITIAL EVALUATION ADULT - ADDITIONAL COMMENTS
Pt lives with her  in private home, pt was independent in all ADL prior to admission. Pt denies using assistive device prior to admission. Pt has flight of stairs to her bedroom/bathroom.

## 2020-12-18 NOTE — DIETITIAN INITIAL EVALUATION ADULT. - OTHER INFO
Pt 61 yo female with PMHx notable for obesity and HLD admitted for monitoring of respiratory status in the setting of COVID PNA - per chart review.    Unable to conduct a face to face interview or nutrition-focused physical exam due to limited contact restrictions related to Pt's medical condition and isolation precautions. Collateral obtained from chart review. Unable to assess Pt's PO intake/appetite @ present. No report of chewing or swallowing difficulties; no report of GI distress (nausea/vomiting/diarrhea/constipation) @ present. +BM (12/18) - per flow sheet.     Of note Pt's HbA1c level 6.3% (12/11/20). Will recommend to add Consistent Carbohydrate restriction to diet rx. Will also recommend to follow up with appropriate RDN upon discharge for the purposes of long-term nutrition evaluation and diet education. RDN remains available.

## 2020-12-18 NOTE — PROGRESS NOTE ADULT - PROBLEM SELECTOR PLAN 5
IMPROVE Score 3 - D-dimer worsening, on  Lovenox for DVT prevention, will increase Lovenox to BID , IMPROVE Score 3 - D-dimer was worsening but trending down today , on  Lovenox for DVT prevention,  increased  Lovenox to BID ,

## 2020-12-18 NOTE — DIETITIAN INITIAL EVALUATION ADULT. - ADD RECOMMEND
1. Encourage & assist Pt with meals; Monitor PO diet tolerance; Honor food preferences;         2. Monitor labs, hydration status;           3. Will recommend to follow up with appropriate RDN upon discharge for the purposes of long-term nutrition evaluation and diet education;

## 2020-12-18 NOTE — PROGRESS NOTE ADULT - SUBJECTIVE AND OBJECTIVE BOX
ABIGAIL LI 60y [id number]    Patient is a 60y old  Female who presents with a chief complaint of SOB due to COVID PNA (18 Dec 2020 09:06)      Follow Up/CC:  ID following for covid    Interval History/ROS: no fever, sob better, fatigue+    Allergies    No Known Allergies    Intolerances        ANTIMICROBIALS:      MEDICATIONS  (STANDING):  enoxaparin Injectable 40 milliGRAM(s) SubCutaneous two times a day    MEDICATIONS  (PRN):  acetaminophen   Tablet .. 650 milliGRAM(s) Oral every 6 hours PRN Temp greater or equal to 38C (100.4F), Mild Pain (1 - 3), Moderate Pain (4 - 6)  aluminum hydroxide/magnesium hydroxide/simethicone Suspension 30 milliLiter(s) Oral every 4 hours PRN Dyspepsia  guaifenesin/dextromethorphan  Syrup 10 milliLiter(s) Oral every 4 hours PRN Cough  melatonin 3 milliGRAM(s) Oral at bedtime PRN Insomnia  ondansetron Injectable 4 milliGRAM(s) IV Push every 4 hours PRN Nausea and/or Vomiting        Vital Signs Last 24 Hrs  T(C): 36.6 (18 Dec 2020 14:30), Max: 36.6 (17 Dec 2020 22:34)  T(F): 97.8 (18 Dec 2020 14:30), Max: 97.8 (17 Dec 2020 22:34)  HR: 57 (18 Dec 2020 14:30) (57 - 78)  BP: 112/59 (18 Dec 2020 14:30) (112/59 - 130/73)  BP(mean): --  RR: 20 (18 Dec 2020 14:30) (18 - 22)  SpO2: 97% (18 Dec 2020 14:30) (93% - 97%)    CBC Full  -  ( 18 Dec 2020 07:34 )  WBC Count : 5.38 K/uL  RBC Count : 4.36 M/uL  Hemoglobin : 12.0 g/dL  Hematocrit : 36.5 %  Platelet Count - Automated : 271 K/uL  Mean Cell Volume : 83.7 fL  Mean Cell Hemoglobin : 27.5 pg  Mean Cell Hemoglobin Concentration : 32.9 gm/dL  Auto Neutrophil # : x  Auto Lymphocyte # : x  Auto Monocyte # : x  Auto Eosinophil # : x  Auto Basophil # : x  Auto Neutrophil % : x  Auto Lymphocyte % : x  Auto Monocyte % : x  Auto Eosinophil % : x  Auto Basophil % : x    12-18    x   |  x   |  x   ----------------------------<  x   x    |  x   |  0.76    Ca    9.0      17 Dec 2020 07:21    TPro  6.8  /  Alb  3.4  /  TBili  0.3  /  DBili  <0.2  /  AST  20  /  ALT  28  /  AlkPhos  60  12-18    LIVER FUNCTIONS - ( 18 Dec 2020 07:34 )  Alb: 3.4 g/dL / Pro: 6.8 g/dL / ALK PHOS: 60 U/L / ALT: 28 U/L / AST: 20 U/L / GGT: x               MICROBIOLOGY:  .Blood Blood  12-13-20   No Growth Final  --  --      .Blood Blood-Peripheral  12-11-20   No Growth Final  --  --      .Blood Blood-Peripheral  12-11-20   Growth in aerobic bottle: Staphylococcus hominis  Coag Negative Staphylococcus  Single set isolate, possible contaminant. Contact  Microbiology if susceptibility testing clinically  indicated.  "Due to technical problems, Proteus sp. will Not be reported as part of  the BCID panel until further notice"  ***Blood Panel PCR results on this specimen are available  approximately 3 hours after the Gram stain result.***  Gram stain, PCR, and/or culture results may not always  correspond due to difference in methodologies.  ************************************************************  This PCR assay was performed using [employer].  The following targets are tested for: Enterococcus,  vancomycin resistant enterococci, Listeria monocytogenes,  coagulase negative staphylococci, S. aureus,  methicillin resistant S. aureus, Streptococcus agalactiae  (Group B), S. pneumoniae, S. pyogenes (Group A),  Acinetobacter baumannii, Enterobacter cloacae, E. coli,  Klebsiella oxytoca, K. pneumoniae, Proteus sp.,  Serratia marcescens, Haemophilus influenzae,  Neisseria meningitidis, Pseudomonas aeruginosa, Candida  albicans, C. glabrata, C krusei, C parapsilosis,  C. tropicalis and the KPC resistance gene.  --  Blood Culture PCR      RADIOLOGY    < from: Xray Chest 1 View- PORTABLE-Urgent (Xray Chest 1 View- PORTABLE-Urgent .) (12.11.20 @ 12:08) >  Vague increased markings and wispy hazy groundglass opacities suggested in peripheral left midlung and peripheral right lower lung suspicious for evolving multifocal infectious process including from potential atypical viral etiologies, follow-up suggested otherwise chest CT would be helpful for more definitive evaluation. No pleural effusions or pneumothorax.    Heart size and mediastinal width inaccurately assessed on the projection.    Trachea midline.    Unremarkable osseous structures.    < end of copied text >

## 2020-12-18 NOTE — PHYSICAL THERAPY INITIAL EVALUATION ADULT - PERTINENT HX OF CURRENT PROBLEM, REHAB EVAL
61 yo obese F with HLD presents with new shortness of breath in setting of recent COVID diagnosis as OP. Patient states that hse has been going [facility] [employer], last 12/1/20, and many of the congregates do not wear masks the entire service, states started feeling ill 12/3/20.  Started with cough/chills/general maliase and weakness.

## 2020-12-18 NOTE — PROGRESS NOTE ADULT - ATTENDING COMMENTS
Shade Howell  Attending Physician   Division of Infectious Disease  Pager #[phone]  After 5pm/weekend or no response, call #[phone]    Please call the ID service [phone] with questions or concerns over the weekend.

## 2020-12-18 NOTE — PROGRESS NOTE ADULT - PROBLEM SELECTOR PLAN 1
-supportive care  -maintain oxygenation saturation  -monitor fever curve  -continue airborne/contact isolation  -cont dexamethasone 6 mg daily  -s/p remdesevir   -monitor pulse ox  -contact/airborne precautions   -wean O2 as tolerated  -trend inflammatory markers

## 2020-12-18 NOTE — PROGRESS NOTE ADULT - PROBLEM SELECTOR PLAN 2
COVID PCR + on 12/7 (as OP) and 12/11 ([facility]).  CXR with hazy opacities.  pt symptomatic. In ED given decadron 6 mg x1.  - Pt saturating  % on 3L NC   -ID eval appreciated, recommend staring Remdesivir and dexamethasone    -c/w Remdesivir day4/5 and [id number] day4/10   -Monitor inflammatory markers, LFTs and renal fn closely   - c/w supportive care with cough suppressants and albuterol MDIs  - PRN Tylenol for  body aches and headache. COVID PCR + on 12/7 (as OP) and 12/11 ([facility]).  CXR with hazy opacities.  pt symptomatic. In ED given decadron 6 mg x1.  - Pt saturating  % on 3L NC   -ID eval appreciated, recommend staring Remdesivir and dexamethasone    -c/w Remdesivir day5/5 and [id number] day5/10   -Monitor inflammatory markers, LFTs and renal fn closely   - c/w supportive care with cough suppressants and albuterol MDIs  - PRN Tylenol for  body aches and headache.

## 2020-12-18 NOTE — DIETITIAN INITIAL EVALUATION ADULT. - IDEAL BODY WEIGHT (KG)
"Pt reports feeling dizzy, \"in a fog\", he was seen on Saturday after taking mushrooms and reports not feeling better and unable to sleep with 1 hour at a time here and their, reports the tv was weird with it jetting out from the sides tonight, denies any drug use since Saturday  " 58.9

## 2020-12-19 LAB
ALBUMIN SERPL ELPH-MCNC: 3.5 G/DL — SIGNIFICANT CHANGE UP (ref 3.3–5)
ALP SERPL-CCNC: 62 U/L — SIGNIFICANT CHANGE UP (ref 40–120)
ALT FLD-CCNC: 29 U/L — SIGNIFICANT CHANGE UP (ref 4–33)
ANION GAP SERPL CALC-SCNC: 11 MMOL/L — SIGNIFICANT CHANGE UP (ref 7–14)
AST SERPL-CCNC: 24 U/L — SIGNIFICANT CHANGE UP (ref 4–32)
BASOPHILS # BLD AUTO: 0.01 K/UL — SIGNIFICANT CHANGE UP (ref 0–0.2)
BASOPHILS NFR BLD AUTO: 0.2 % — SIGNIFICANT CHANGE UP (ref 0–2)
BILIRUB SERPL-MCNC: 0.3 MG/DL — SIGNIFICANT CHANGE UP (ref 0.2–1.2)
BUN SERPL-MCNC: 18 MG/DL — SIGNIFICANT CHANGE UP (ref 7–23)
CALCIUM SERPL-MCNC: 8.7 MG/DL — SIGNIFICANT CHANGE UP (ref 8.4–10.5)
CHLORIDE SERPL-SCNC: 104 MMOL/L — SIGNIFICANT CHANGE UP (ref 98–107)
CO2 SERPL-SCNC: 24 MMOL/L — SIGNIFICANT CHANGE UP (ref 22–31)
CREAT SERPL-MCNC: 0.69 MG/DL — SIGNIFICANT CHANGE UP (ref 0.5–1.3)
EOSINOPHIL # BLD AUTO: 0 K/UL — SIGNIFICANT CHANGE UP (ref 0–0.5)
EOSINOPHIL NFR BLD AUTO: 0 % — SIGNIFICANT CHANGE UP (ref 0–6)
GLUCOSE SERPL-MCNC: 141 MG/DL — HIGH (ref 70–99)
HCT VFR BLD CALC: 36.8 % — SIGNIFICANT CHANGE UP (ref 34.5–45)
HGB BLD-MCNC: 12.1 G/DL — SIGNIFICANT CHANGE UP (ref 11.5–15.5)
IANC: 3.9 K/UL — SIGNIFICANT CHANGE UP (ref 1.5–8.5)
IMM GRANULOCYTES NFR BLD AUTO: 0.4 % — SIGNIFICANT CHANGE UP (ref 0–1.5)
LYMPHOCYTES # BLD AUTO: 0.88 K/UL — LOW (ref 1–3.3)
LYMPHOCYTES # BLD AUTO: 16.7 % — SIGNIFICANT CHANGE UP (ref 13–44)
MAGNESIUM SERPL-MCNC: 2.6 MG/DL — SIGNIFICANT CHANGE UP (ref 1.6–2.6)
MCHC RBC-ENTMCNC: 27.5 PG — SIGNIFICANT CHANGE UP (ref 27–34)
MCHC RBC-ENTMCNC: 32.9 GM/DL — SIGNIFICANT CHANGE UP (ref 32–36)
MCV RBC AUTO: 83.6 FL — SIGNIFICANT CHANGE UP (ref 80–100)
MONOCYTES # BLD AUTO: 0.47 K/UL — SIGNIFICANT CHANGE UP (ref 0–0.9)
MONOCYTES NFR BLD AUTO: 8.9 % — SIGNIFICANT CHANGE UP (ref 2–14)
NEUTROPHILS # BLD AUTO: 3.9 K/UL — SIGNIFICANT CHANGE UP (ref 1.8–7.4)
NEUTROPHILS NFR BLD AUTO: 73.8 % — SIGNIFICANT CHANGE UP (ref 43–77)
NRBC # BLD: 0 /100 WBCS — SIGNIFICANT CHANGE UP
NRBC # FLD: 0 K/UL — SIGNIFICANT CHANGE UP
PHOSPHATE SERPL-MCNC: 3.4 MG/DL — SIGNIFICANT CHANGE UP (ref 2.5–4.5)
PLATELET # BLD AUTO: 310 K/UL — SIGNIFICANT CHANGE UP (ref 150–400)
POTASSIUM SERPL-MCNC: 4.5 MMOL/L — SIGNIFICANT CHANGE UP (ref 3.5–5.3)
POTASSIUM SERPL-SCNC: 4.5 MMOL/L — SIGNIFICANT CHANGE UP (ref 3.5–5.3)
PROT SERPL-MCNC: 7 G/DL — SIGNIFICANT CHANGE UP (ref 6–8.3)
RBC # BLD: 4.4 M/UL — SIGNIFICANT CHANGE UP (ref 3.8–5.2)
RBC # FLD: 13.4 % — SIGNIFICANT CHANGE UP (ref 10.3–14.5)
SODIUM SERPL-SCNC: 139 MMOL/L — SIGNIFICANT CHANGE UP (ref 135–145)
WBC # BLD: 5.28 K/UL — SIGNIFICANT CHANGE UP (ref 3.8–10.5)
WBC # FLD AUTO: 5.28 K/UL — SIGNIFICANT CHANGE UP (ref 3.8–10.5)

## 2020-12-19 PROCEDURE — 99232 SBSQ HOSP IP/OBS MODERATE 35: CPT

## 2020-12-19 PROCEDURE — 93010 ELECTROCARDIOGRAM REPORT: CPT

## 2020-12-19 RX ORDER — SENNA PLUS 8.6 MG/1
2 TABLET ORAL AT BEDTIME
Refills: 0 | Status: DISCONTINUED | OUTPATIENT
Start: 2020-12-19 | End: 2020-12-29

## 2020-12-19 RX ORDER — ALBUTEROL 90 UG/1
2 AEROSOL, METERED ORAL EVERY 6 HOURS
Refills: 0 | Status: DISCONTINUED | OUTPATIENT
Start: 2020-12-19 | End: 2020-12-29

## 2020-12-19 RX ORDER — DEXAMETHASONE 0.5 MG/5ML
6 ELIXIR ORAL EVERY 24 HOURS
Refills: 0 | Status: COMPLETED | OUTPATIENT
Start: 2020-12-19 | End: 2020-12-23

## 2020-12-19 RX ORDER — POLYETHYLENE GLYCOL 3350 17 G/17G
17 POWDER, FOR SOLUTION ORAL DAILY
Refills: 0 | Status: DISCONTINUED | OUTPATIENT
Start: 2020-12-19 | End: 2020-12-29

## 2020-12-19 RX ADMIN — SENNA PLUS 2 TABLET(S): 8.6 TABLET ORAL at 21:10

## 2020-12-19 RX ADMIN — ENOXAPARIN SODIUM 40 MILLIGRAM(S): 100 INJECTION SUBCUTANEOUS at 18:29

## 2020-12-19 RX ADMIN — Medication 3 MILLIGRAM(S): at 21:10

## 2020-12-19 RX ADMIN — ALBUTEROL 2 PUFF(S): 90 AEROSOL, METERED ORAL at 00:44

## 2020-12-19 RX ADMIN — Medication 10 MILLILITER(S): at 00:44

## 2020-12-19 RX ADMIN — ENOXAPARIN SODIUM 40 MILLIGRAM(S): 100 INJECTION SUBCUTANEOUS at 05:35

## 2020-12-19 RX ADMIN — Medication 10 MILLILITER(S): at 21:11

## 2020-12-19 RX ADMIN — Medication 6 MILLIGRAM(S): at 12:11

## 2020-12-19 RX ADMIN — POLYETHYLENE GLYCOL 3350 17 GRAM(S): 17 POWDER, FOR SOLUTION ORAL at 18:31

## 2020-12-19 RX ADMIN — ALBUTEROL 2 PUFF(S): 90 AEROSOL, METERED ORAL at 21:11

## 2020-12-19 NOTE — PROGRESS NOTE ADULT - ATTENDING COMMENTS
informed by ACP that pt c/o chest pain, , chest pain resolved after she changed her position, either MSK or GI related as pt has constipation , EKG unchanged,   will check cardiac enzymes if recurs

## 2020-12-19 NOTE — CHART NOTE - NSCHARTNOTEFT_GEN_A_CORE
ACP MEDICINE COVERAGE - Medicine Subsequent Hospital Care Note    CC: chest pain  HPI/Subjective: Notified by RN that patient c/o chest pain. Pt seen and examined at bedside. Pt states her chest pain feels sharp and resolved within 5 minutes. Pt had this chest pain before and states it resolves when she lies on her side. Pt states she has not had a BM in 4 days. Pt denies dizziness, SOB, dyspnea on exertion, headache, fever, chills, diaphoresis, malaise, night sweats, generalized weakness.    Vital Signs Last 24 Hrs  T(C): 36.7 (12-19-20 @ 12:11), Max: 36.7 (12-18-20 @ 22:15)  T(F): 98.1 (12-19-20 @ 12:11), Max: 98.1 (12-19-20 @ 12:11)  HR: 64 (12-19-20 @ 13:27) (57 - 64)  BP: 122/65 (12-19-20 @ 13:27) (103/58 - 122/65)  RR: 20 (12-19-20 @ 05:33) (20 - 22)  SpO2: 97% (12-19-20 @ 13:27) (96% - 97%) on (O2)    PHYSICAL EXAM:  Constitutional: NAD, well-developed, well-nourished. Lying in bed comfortably.  Ears, nose, Mouth, and Throat: normal external ears and nose, normal hearing, moist oral mucosa  Eyes: normal conjunctiva, EOMI, PEERL  Neck: supple, no JVD  Respiratory: Clear to auscultation bilaterally. No wheezes, rales or rhonchi. Normal respiratory effort  Cardiovascular: regular rate and rhythm, S1 and S2, no murmurs, rubs or gallops, no edema, 2+ peripheral pulses  Gastrointestinal: soft, nontender, nondistended, +bowel sounds, no hernia  Skin: warm, dry, no rash  Neurologic: sensation grossly intact, CN grossly intact, non-focal exam  Musculoskeletal: no clubbing, no cyanosis, no joint swelling  Psychiatric: A&Ox3, appropriate mood, affect    LABS:                        12.1   5.28  )-----------( 310      ( 19 Dec 2020 07:27 )             36.8     12-19    139  |  104  |  18  ----------------------------<  141<H>  4.5   |  24  |  0.69    Ca    8.7      19 Dec 2020 07:27  Phos  3.4     12-19  Mg     2.6     12-19    TPro  7.0  /  Alb  3.5  /  TBili  0.3  /  DBili  x   /  AST  24  /  ALT  29  /  AlkPhos  62  12-19    PT/INR: PT: 12.9 sec | INR: 1.13 ratio (12-11-20 @ 11:50)  PTT: 26.9 sec (12-11-20 @ 11:50)    CARDIAC ENZYMES            Serum Pro-Brain Natriuretic Peptide:   D-Dimer Assay: 2448 ng/mL DDU (12-18-20 @ 07:34)  2718 ng/mL DDU (12-17-20 @ 07:21)  765 ng/mL DDU (12-14-20 @ 07:20)      Urinanalysis Basic (12-19-20 @ 15:43):      Blood Gas Venous (12-19-20 @ 15:43):  pH: 7.40 | HCO3: 24 | pCO2: 42 | pO2: 28 | Lactate: 1.3  pH: 7.46 | HCO3: 26 | pCO2: 38 | pO2: 33 | Lactate: 2.6      Blood Gas Arterial (12-19-20 @ 15:43):      CAPILLARY BLOOD GLUCOSE:      COVID PCR:      RADIOLOGY:    MEDICATIONS  (STANDING):  dexAMETHasone  Injectable 6 milliGRAM(s) IV Push every 24 hours  enoxaparin Injectable 40 milliGRAM(s) SubCutaneous two times a day  polyethylene glycol 3350 17 Gram(s) Oral daily  senna 2 Tablet(s) Oral at bedtime    MEDICATIONS  (PRN):  acetaminophen   Tablet .. 650 milliGRAM(s) Oral every 6 hours PRN Temp greater or equal to 38C (100.4F), Mild Pain (1 - 3), Moderate Pain (4 - 6)  ALBUTerol    90 MICROgram(s) HFA Inhaler 2 Puff(s) Inhalation every 6 hours PRN Shortness of Breath and/or Wheezing  aluminum hydroxide/magnesium hydroxide/simethicone Suspension 30 milliLiter(s) Oral every 4 hours PRN Dyspepsia  guaifenesin/dextromethorphan  Syrup 10 milliLiter(s) Oral every 4 hours PRN Cough  melatonin 3 milliGRAM(s) Oral at bedtime PRN Insomnia  ondansetron Injectable 4 milliGRAM(s) IV Push every 4 hours PRN Nausea and/or Vomiting    I&O's Summary    I reviewed the above labs, radiology, medications, tests, telemetry, and EKG interpretation.    ASSESSMENT/PLAN:  60y obese F PMHx HLD p/w SOB and cough 2/2 COVID (OP 12/7, confirmed here 12/11). Pt now with chest pain. EKG done STAT that showes sinus bradycardia, unchanged from prior. No ST changes or t wave inversions. Sounds more GI in nature than cardiac. Started pt on Miralax and Senna since she has not had a BM in 4 days. If pt has chest pain again, will send cardiac enzymes. Will continue to monitor closely. Discussed with Dr. Edouard.    - Clinical findings, labs, tests, telemetry, and ekg reviewed with attending. Will monitor patient closely.       Low complexity/risk (30min)

## 2020-12-19 NOTE — PROGRESS NOTE ADULT - PROBLEM SELECTOR PLAN 2
COVID PCR + on 12/7 (as OP) and 12/11 ([facility]).  CXR with hazy opacities.  pt symptomatic. In ED given decadron 6 mg x1.  - Pt saturating  % on 3L NC   -ID eval appreciated, recommend staring Remdesivir and dexamethasone    -c/w Remdesivir day5/5 and [id number] day5/10   -Monitor inflammatory markers, LFTs and renal fn closely   - c/w supportive care with cough suppressants and albuterol MDIs  - PRN Tylenol for  body aches and headache. COVID PCR + on 12/7 (as OP) and 12/11 ([facility]).  CXR with hazy opacities.  pt symptomatic. In ED given decadron 6 mg x1.  - Pt saturating  % on 3L NC   -ID eval appreciated, recommend staring Remdesivir and dexamethasone    -completed  Remdesivir day5/5 and [id number] day6 /10   -Monitor inflammatory markers, LFTs and renal fn closely   - c/w supportive care with cough suppressants and albuterol MDIs  - PRN Tylenol for  body aches and headache.

## 2020-12-19 NOTE — PROGRESS NOTE ADULT - PROBLEM SELECTOR PLAN 5
IMPROVE Score 3 - D-dimer was worsening but trending down today , on  Lovenox for DVT prevention,  increased  Lovenox to BID ,

## 2020-12-19 NOTE — PROGRESS NOTE ADULT - SUBJECTIVE AND OBJECTIVE BOX
Patient is a 60y old  Female who presents with a chief complaint of SOB due to COVID PNA (18 Dec 2020 12:09)      SUBJECTIVE / OVERNIGHT EVENTS:    MEDICATIONS  (STANDING):  enoxaparin Injectable 40 milliGRAM(s) SubCutaneous two times a day    MEDICATIONS  (PRN):  acetaminophen   Tablet .. 650 milliGRAM(s) Oral every 6 hours PRN Temp greater or equal to 38C (100.4F), Mild Pain (1 - 3), Moderate Pain (4 - 6)  ALBUTerol    90 MICROgram(s) HFA Inhaler 2 Puff(s) Inhalation every 6 hours PRN Shortness of Breath and/or Wheezing  aluminum hydroxide/magnesium hydroxide/simethicone Suspension 30 milliLiter(s) Oral every 4 hours PRN Dyspepsia  guaifenesin/dextromethorphan  Syrup 10 milliLiter(s) Oral every 4 hours PRN Cough  melatonin 3 milliGRAM(s) Oral at bedtime PRN Insomnia  ondansetron Injectable 4 milliGRAM(s) IV Push every 4 hours PRN Nausea and/or Vomiting      Vital Signs Last 24 Hrs  T(C): 36.6 (19 Dec 2020 05:33), Max: 36.7 (18 Dec 2020 22:15)  T(F): 97.9 (19 Dec 2020 05:33), Max: 98 (18 Dec 2020 22:15)  HR: 57 (19 Dec 2020 05:33) (57 - 62)  BP: 106/62 (19 Dec 2020 05:33) (105/62 - 112/59)  BP(mean): --  RR: 20 (19 Dec 2020 05:33) (20 - 22)  SpO2: 96% (19 Dec 2020 05:33) (96% - 97%)  CAPILLARY BLOOD GLUCOSE        I&O's Summary      PHYSICAL EXAM:  GENERAL: NAD, well-developed  HEAD:  Atraumatic, Normocephalic  EYES: EOMI, PERRLA, conjunctiva and sclera clear  NECK: Supple, No JVD  CHEST/LUNG: Clear to auscultation bilaterally; No wheeze  HEART: Regular rate and rhythm; No murmurs, rubs, or gallops  ABDOMEN: Soft, Nontender, Nondistended; Bowel sounds present  EXTREMITIES:  2+ Peripheral Pulses, No clubbing, cyanosis, or edema  PSYCH: AAOx3  NEUROLOGY: non-focal  SKIN: No rashes or lesions    LABS:                        12.1   5.28  )-----------( 310      ( 19 Dec 2020 07:27 )             36.8     12-19    139  |  104  |  18  ----------------------------<  141<H>  4.5   |  24  |  0.69    Ca    8.7      19 Dec 2020 07:27  Phos  3.4     12-19  Mg     2.6     12-19    TPro  7.0  /  Alb  3.5  /  TBili  0.3  /  DBili  x   /  AST  24  /  ALT  29  /  AlkPhos  62  12-19              RADIOLOGY & ADDITIONAL TESTS:    Imaging Personally Reviewed:    Consultant(s) Notes Reviewed:      Care Discussed with Consultants/Other Providers:   Patient is a 60y old  Female who presents with a chief complaint of SOB due to COVID PNA (18 Dec 2020 12:09)      SUBJECTIVE / OVERNIGHT EVENTS: patient seen and examined by bedside, pt feeling better over all but said when she went to the bathroom without the oxygen, she felt terrible , pt reporting nl BM for 4 days       MEDICATIONS  (STANDING):  enoxaparin Injectable 40 milliGRAM(s) SubCutaneous two times a day    MEDICATIONS  (PRN):  acetaminophen   Tablet .. 650 milliGRAM(s) Oral every 6 hours PRN Temp greater or equal to 38C (100.4F), Mild Pain (1 - 3), Moderate Pain (4 - 6)  ALBUTerol    90 MICROgram(s) HFA Inhaler 2 Puff(s) Inhalation every 6 hours PRN Shortness of Breath and/or Wheezing  aluminum hydroxide/magnesium hydroxide/simethicone Suspension 30 milliLiter(s) Oral every 4 hours PRN Dyspepsia  guaifenesin/dextromethorphan  Syrup 10 milliLiter(s) Oral every 4 hours PRN Cough  melatonin 3 milliGRAM(s) Oral at bedtime PRN Insomnia  ondansetron Injectable 4 milliGRAM(s) IV Push every 4 hours PRN Nausea and/or Vomiting      Vital Signs Last 24 Hrs  T(C): 36.6 (19 Dec 2020 05:33), Max: 36.7 (18 Dec 2020 22:15)  T(F): 97.9 (19 Dec 2020 05:33), Max: 98 (18 Dec 2020 22:15)  HR: 57 (19 Dec 2020 05:33) (57 - 62)  BP: 106/62 (19 Dec 2020 05:33) (105/62 - 112/59)  BP(mean): --  RR: 20 (19 Dec 2020 05:33) (20 - 22)  SpO2: 96% (19 Dec 2020 05:33) (96% - 97%)      PHYSICAL EXAM:  GENERAL: NAD, well developed   HEAD:  Atraumatic, Normocephalic  EYES: EOMI, PERRLA, conjunctiva and sclera clear  CHEST/LUNG: Clear to auscultation bilaterally; No wheeze  HEART: Regular rate and rhythm;   ABDOMEN: Soft, Nontender, Nondistended; Bowel sounds present  EXTREMITIES:  2+ Peripheral Pulses, No clubbing, cyanosis, or edema  NEUROLOGY: non-focal, AAOX3  SKIN: No rashes or lesions      LABS:                        12.1   5.28  )-----------( 310      ( 19 Dec 2020 07:27 )             36.8     12-19    139  |  104  |  18  ----------------------------<  141<H>  4.5   |  24  |  0.69    Ca    8.7      19 Dec 2020 07:27  Phos  3.4     12-19  Mg     2.6     12-19    TPro  7.0  /  Alb  3.5  /  TBili  0.3  /  DBili  x   /  AST  24  /  ALT  29  /  AlkPhos  62  12-19              RADIOLOGY & ADDITIONAL TESTS:    Imaging Personally Reviewed:    Consultant(s) Notes Reviewed:      Care Discussed with Consultants/Other Providers:

## 2020-12-20 LAB
ALBUMIN SERPL ELPH-MCNC: 3.7 G/DL — SIGNIFICANT CHANGE UP (ref 3.3–5)
ALBUMIN SERPL ELPH-MCNC: 3.8 G/DL — SIGNIFICANT CHANGE UP (ref 3.3–5)
ALP SERPL-CCNC: 66 U/L — SIGNIFICANT CHANGE UP (ref 40–120)
ALP SERPL-CCNC: 67 U/L — SIGNIFICANT CHANGE UP (ref 40–120)
ALT FLD-CCNC: 26 U/L — SIGNIFICANT CHANGE UP (ref 4–33)
ALT FLD-CCNC: 28 U/L — SIGNIFICANT CHANGE UP (ref 4–33)
ANION GAP SERPL CALC-SCNC: 15 MMOL/L — HIGH (ref 7–14)
AST SERPL-CCNC: 22 U/L — SIGNIFICANT CHANGE UP (ref 4–32)
AST SERPL-CCNC: 24 U/L — SIGNIFICANT CHANGE UP (ref 4–32)
BASOPHILS # BLD AUTO: 0.01 K/UL — SIGNIFICANT CHANGE UP (ref 0–0.2)
BASOPHILS NFR BLD AUTO: 0.2 % — SIGNIFICANT CHANGE UP (ref 0–2)
BILIRUB DIRECT SERPL-MCNC: <0.2 MG/DL — SIGNIFICANT CHANGE UP (ref 0–0.2)
BILIRUB INDIRECT FLD-MCNC: >0.1 MG/DL — SIGNIFICANT CHANGE UP (ref 0–1)
BILIRUB SERPL-MCNC: 0.3 MG/DL — SIGNIFICANT CHANGE UP (ref 0.2–1.2)
BILIRUB SERPL-MCNC: 0.3 MG/DL — SIGNIFICANT CHANGE UP (ref 0.2–1.2)
BUN SERPL-MCNC: 20 MG/DL — SIGNIFICANT CHANGE UP (ref 7–23)
CALCIUM SERPL-MCNC: 9.1 MG/DL — SIGNIFICANT CHANGE UP (ref 8.4–10.5)
CHLORIDE SERPL-SCNC: 103 MMOL/L — SIGNIFICANT CHANGE UP (ref 98–107)
CO2 SERPL-SCNC: 22 MMOL/L — SIGNIFICANT CHANGE UP (ref 22–31)
CREAT SERPL-MCNC: 0.82 MG/DL — SIGNIFICANT CHANGE UP (ref 0.5–1.3)
CREAT SERPL-MCNC: 0.84 MG/DL — SIGNIFICANT CHANGE UP (ref 0.5–1.3)
CRP SERPL-MCNC: 11 MG/L — HIGH
D DIMER BLD IA.RAPID-MCNC: 1887 NG/ML DDU — HIGH
EOSINOPHIL # BLD AUTO: 0.01 K/UL — SIGNIFICANT CHANGE UP (ref 0–0.5)
EOSINOPHIL NFR BLD AUTO: 0.2 % — SIGNIFICANT CHANGE UP (ref 0–6)
FERRITIN SERPL-MCNC: 269 NG/ML — HIGH (ref 15–150)
GLUCOSE SERPL-MCNC: 119 MG/DL — HIGH (ref 70–99)
HCT VFR BLD CALC: 39.9 % — SIGNIFICANT CHANGE UP (ref 34.5–45)
HGB BLD-MCNC: 12.8 G/DL — SIGNIFICANT CHANGE UP (ref 11.5–15.5)
IANC: 4.41 K/UL — SIGNIFICANT CHANGE UP (ref 1.5–8.5)
IMM GRANULOCYTES NFR BLD AUTO: 0.5 % — SIGNIFICANT CHANGE UP (ref 0–1.5)
LDH SERPL L TO P-CCNC: 292 U/L — HIGH (ref 135–225)
LYMPHOCYTES # BLD AUTO: 1.21 K/UL — SIGNIFICANT CHANGE UP (ref 1–3.3)
LYMPHOCYTES # BLD AUTO: 18.9 % — SIGNIFICANT CHANGE UP (ref 13–44)
MAGNESIUM SERPL-MCNC: 2.9 MG/DL — HIGH (ref 1.6–2.6)
MCHC RBC-ENTMCNC: 27.4 PG — SIGNIFICANT CHANGE UP (ref 27–34)
MCHC RBC-ENTMCNC: 32.1 GM/DL — SIGNIFICANT CHANGE UP (ref 32–36)
MCV RBC AUTO: 85.4 FL — SIGNIFICANT CHANGE UP (ref 80–100)
MONOCYTES # BLD AUTO: 0.72 K/UL — SIGNIFICANT CHANGE UP (ref 0–0.9)
MONOCYTES NFR BLD AUTO: 11.3 % — SIGNIFICANT CHANGE UP (ref 2–14)
NEUTROPHILS # BLD AUTO: 4.41 K/UL — SIGNIFICANT CHANGE UP (ref 1.8–7.4)
NEUTROPHILS NFR BLD AUTO: 68.9 % — SIGNIFICANT CHANGE UP (ref 43–77)
NRBC # BLD: 0 /100 WBCS — SIGNIFICANT CHANGE UP
NRBC # FLD: 0 K/UL — SIGNIFICANT CHANGE UP
PHOSPHATE SERPL-MCNC: 3.4 MG/DL — SIGNIFICANT CHANGE UP (ref 2.5–4.5)
PLATELET # BLD AUTO: 351 K/UL — SIGNIFICANT CHANGE UP (ref 150–400)
POTASSIUM SERPL-MCNC: 4.8 MMOL/L — SIGNIFICANT CHANGE UP (ref 3.5–5.3)
POTASSIUM SERPL-SCNC: 4.8 MMOL/L — SIGNIFICANT CHANGE UP (ref 3.5–5.3)
PROCALCITONIN SERPL-MCNC: 0.02 NG/ML — SIGNIFICANT CHANGE UP (ref 0.02–0.1)
PROT SERPL-MCNC: 7.2 G/DL — SIGNIFICANT CHANGE UP (ref 6–8.3)
PROT SERPL-MCNC: 7.2 G/DL — SIGNIFICANT CHANGE UP (ref 6–8.3)
RBC # BLD: 4.67 M/UL — SIGNIFICANT CHANGE UP (ref 3.8–5.2)
RBC # FLD: 13.4 % — SIGNIFICANT CHANGE UP (ref 10.3–14.5)
SODIUM SERPL-SCNC: 140 MMOL/L — SIGNIFICANT CHANGE UP (ref 135–145)
WBC # BLD: 6.39 K/UL — SIGNIFICANT CHANGE UP (ref 3.8–10.5)
WBC # FLD AUTO: 6.39 K/UL — SIGNIFICANT CHANGE UP (ref 3.8–10.5)

## 2020-12-20 PROCEDURE — 99232 SBSQ HOSP IP/OBS MODERATE 35: CPT

## 2020-12-20 RX ADMIN — ENOXAPARIN SODIUM 40 MILLIGRAM(S): 100 INJECTION SUBCUTANEOUS at 18:11

## 2020-12-20 RX ADMIN — SENNA PLUS 2 TABLET(S): 8.6 TABLET ORAL at 22:02

## 2020-12-20 RX ADMIN — ALBUTEROL 2 PUFF(S): 90 AEROSOL, METERED ORAL at 05:33

## 2020-12-20 RX ADMIN — Medication 3 MILLIGRAM(S): at 22:02

## 2020-12-20 RX ADMIN — Medication 30 MILLILITER(S): at 22:03

## 2020-12-20 RX ADMIN — Medication 10 MILLILITER(S): at 18:16

## 2020-12-20 RX ADMIN — Medication 6 MILLIGRAM(S): at 12:23

## 2020-12-20 RX ADMIN — ENOXAPARIN SODIUM 40 MILLIGRAM(S): 100 INJECTION SUBCUTANEOUS at 05:33

## 2020-12-20 NOTE — PROGRESS NOTE ADULT - PROBLEM SELECTOR PLAN 2
COVID PCR + on 12/7 (as OP) and 12/11 ([facility]).  CXR with hazy opacities.  pt symptomatic. In ED given decadron 6 mg x1.  - Pt saturating  % on 3L NC   -ID eval appreciated, recommend staring Remdesivir and dexamethasone    -completed  Remdesivir day5/5 and [id number] day6 /10   -Monitor inflammatory markers, LFTs and renal fn closely   - c/w supportive care with cough suppressants and albuterol MDIs  - PRN Tylenol for  body aches and headache. COVID PCR + on 12/7 (as OP) and 12/11 ([facility]).  CXR with hazy opacities.  pt symptomatic. In ED given decadron 6 mg x1.  - Pt saturating  % on 2L NC   -ID eval appreciated, recommend staring Remdesivir and dexamethasone    -completed  Remdesivir day5/5 and [id number] day 7 /10   -Monitor inflammatory markers, LFTs and renal fn closely , trending down   - c/w supportive care with cough suppressants and albuterol MDIs  - PRN Tylenol for  body aches and headache.  -incentive spirometry

## 2020-12-20 NOTE — PROGRESS NOTE ADULT - ATTENDING COMMENTS
Dc planning in 1-2 days once weaned off O2  plan of care d/w  on the phone Dc planning in 1-2 days once weaned off O2  plan of care d/w  on the phone( with Somali translation with help from Dr Geller)

## 2020-12-20 NOTE — PROGRESS NOTE ADULT - PROBLEM SELECTOR PLAN 5
IMPROVE Score 3 - D-dimer was worsening but trending down today , on  Lovenox for DVT prevention,  increased  Lovenox to BID , IMPROVE Score 3 - D-dimer was worsening but trending down today , on  Lovenox for DVT prevention,  increased  Lovenox to BID ,  constipation: bowel regimen was added , t still had no BM, will give Dulcolax suppository today

## 2020-12-20 NOTE — PROGRESS NOTE ADULT - SUBJECTIVE AND OBJECTIVE BOX
Patient is a 60y old  Female who presents with a chief complaint of SOB due to COVID PNA (19 Dec 2020 10:32)      SUBJECTIVE / OVERNIGHT EVENTS:    MEDICATIONS  (STANDING):  dexAMETHasone  Injectable 6 milliGRAM(s) IV Push every 24 hours  enoxaparin Injectable 40 milliGRAM(s) SubCutaneous two times a day  polyethylene glycol 3350 17 Gram(s) Oral daily  senna 2 Tablet(s) Oral at bedtime    MEDICATIONS  (PRN):  acetaminophen   Tablet .. 650 milliGRAM(s) Oral every 6 hours PRN Temp greater or equal to 38C (100.4F), Mild Pain (1 - 3), Moderate Pain (4 - 6)  ALBUTerol    90 MICROgram(s) HFA Inhaler 2 Puff(s) Inhalation every 6 hours PRN Shortness of Breath and/or Wheezing  aluminum hydroxide/magnesium hydroxide/simethicone Suspension 30 milliLiter(s) Oral every 4 hours PRN Dyspepsia  guaifenesin/dextromethorphan  Syrup 10 milliLiter(s) Oral every 4 hours PRN Cough  melatonin 3 milliGRAM(s) Oral at bedtime PRN Insomnia  ondansetron Injectable 4 milliGRAM(s) IV Push every 4 hours PRN Nausea and/or Vomiting      Vital Signs Last 24 Hrs  T(C): 36.8 (20 Dec 2020 05:32), Max: 36.8 (20 Dec 2020 05:32)  T(F): 98.2 (20 Dec 2020 05:32), Max: 98.2 (20 Dec 2020 05:32)  HR: 60 (20 Dec 2020 05:32) (58 - 64)  BP: 110/62 (20 Dec 2020 05:32) (103/58 - 122/65)  BP(mean): --  RR: 18 (20 Dec 2020 05:32) (18 - 18)  SpO2: 97% (20 Dec 2020 05:32) (96% - 97%)  CAPILLARY BLOOD GLUCOSE        I&O's Summary      PHYSICAL EXAM:  GENERAL: NAD, well-developed  HEAD:  Atraumatic, Normocephalic  EYES: EOMI, PERRLA, conjunctiva and sclera clear  NECK: Supple, No JVD  CHEST/LUNG: Clear to auscultation bilaterally; No wheeze  HEART: Regular rate and rhythm; No murmurs, rubs, or gallops  ABDOMEN: Soft, Nontender, Nondistended; Bowel sounds present  EXTREMITIES:  2+ Peripheral Pulses, No clubbing, cyanosis, or edema  PSYCH: AAOx3  NEUROLOGY: non-focal  SKIN: No rashes or lesions    LABS:                        12.8   6.39  )-----------( 351      ( 20 Dec 2020 07:49 )             39.9     12-20    140  |  103  |  20  ----------------------------<  119<H>  4.8   |  22  |  0.84    Ca    9.1      20 Dec 2020 07:49  Phos  3.4     12-20  Mg     2.9     12-20    TPro  7.2  /  Alb  3.8  /  TBili  0.3  /  DBili  <0.2  /  AST  24  /  ALT  26  /  AlkPhos  66  12-20              RADIOLOGY & ADDITIONAL TESTS:    Imaging Personally Reviewed:    Consultant(s) Notes Reviewed:      Care Discussed with Consultants/Other Providers:   Patient is a 60y old  Female who presents with a chief complaint of SOB due to COVID PNA (19 Dec 2020 10:32)      SUBJECTIVE / OVERNIGHT EVENTS: patient seen and examined by bedside, feeling better than before cough and and SOB improving , pt reported chest pain yesterday for a few minutes and then resolved, has not recurred   headache dizziness now resolved     MEDICATIONS  (STANDING):  dexAMETHasone  Injectable 6 milliGRAM(s) IV Push every 24 hours  enoxaparin Injectable 40 milliGRAM(s) SubCutaneous two times a day  polyethylene glycol 3350 17 Gram(s) Oral daily  senna 2 Tablet(s) Oral at bedtime    MEDICATIONS  (PRN):  acetaminophen   Tablet .. 650 milliGRAM(s) Oral every 6 hours PRN Temp greater or equal to 38C (100.4F), Mild Pain (1 - 3), Moderate Pain (4 - 6)  ALBUTerol    90 MICROgram(s) HFA Inhaler 2 Puff(s) Inhalation every 6 hours PRN Shortness of Breath and/or Wheezing  aluminum hydroxide/magnesium hydroxide/simethicone Suspension 30 milliLiter(s) Oral every 4 hours PRN Dyspepsia  guaifenesin/dextromethorphan  Syrup 10 milliLiter(s) Oral every 4 hours PRN Cough  melatonin 3 milliGRAM(s) Oral at bedtime PRN Insomnia  ondansetron Injectable 4 milliGRAM(s) IV Push every 4 hours PRN Nausea and/or Vomiting      Vital Signs Last 24 Hrs  T(C): 36.8 (20 Dec 2020 05:32), Max: 36.8 (20 Dec 2020 05:32)  T(F): 98.2 (20 Dec 2020 05:32), Max: 98.2 (20 Dec 2020 05:32)  HR: 60 (20 Dec 2020 05:32) (58 - 64)  BP: 110/62 (20 Dec 2020 05:32) (103/58 - 122/65)  BP(mean): --  RR: 18 (20 Dec 2020 05:32) (18 - 18)  SpO2: 97% (20 Dec 2020 05:32) (96% - 97%)        PHYSICAL EXAM:  GENERAL: NAD, well developed   HEAD:  Atraumatic, Normocephalic  EYES: EOMI, PERRLA, conjunctiva and sclera clear  CHEST/LUNG: Clear to auscultation bilaterally; No wheeze  HEART: Regular rate and rhythm;   ABDOMEN: Soft, Nontender, Nondistended; Bowel sounds present  EXTREMITIES:  2+ Peripheral Pulses, No clubbing, cyanosis, or edema  NEUROLOGY: non-focal, AAOX3  SKIN: No rashes or lesions      LABS:                        12.8   6.39  )-----------( 351      ( 20 Dec 2020 07:49 )             39.9     12-20    140  |  103  |  20  ----------------------------<  119<H>  4.8   |  22  |  0.84    Ca    9.1      20 Dec 2020 07:49  Phos  3.4     12-20  Mg     2.9     12-20    TPro  7.2  /  Alb  3.8  /  TBili  0.3  /  DBili  <0.2  /  AST  24  /  ALT  26  /  AlkPhos  66  12-20              RADIOLOGY & ADDITIONAL TESTS:    Imaging Personally Reviewed:    Consultant(s) Notes Reviewed:      Care Discussed with Consultants/Other Providers:

## 2020-12-21 LAB
ALBUMIN SERPL ELPH-MCNC: 3.4 G/DL — SIGNIFICANT CHANGE UP (ref 3.3–5)
ALBUMIN SERPL ELPH-MCNC: 3.4 G/DL — SIGNIFICANT CHANGE UP (ref 3.3–5)
ALP SERPL-CCNC: 61 U/L — SIGNIFICANT CHANGE UP (ref 40–120)
ALP SERPL-CCNC: 62 U/L — SIGNIFICANT CHANGE UP (ref 40–120)
ALT FLD-CCNC: 28 U/L — SIGNIFICANT CHANGE UP (ref 4–33)
ALT FLD-CCNC: 29 U/L — SIGNIFICANT CHANGE UP (ref 4–33)
ANION GAP SERPL CALC-SCNC: 11 MMOL/L — SIGNIFICANT CHANGE UP (ref 7–14)
AST SERPL-CCNC: 22 U/L — SIGNIFICANT CHANGE UP (ref 4–32)
AST SERPL-CCNC: 23 U/L — SIGNIFICANT CHANGE UP (ref 4–32)
BASOPHILS # BLD AUTO: 0.01 K/UL — SIGNIFICANT CHANGE UP (ref 0–0.2)
BASOPHILS NFR BLD AUTO: 0.1 % — SIGNIFICANT CHANGE UP (ref 0–2)
BILIRUB DIRECT SERPL-MCNC: <0.2 MG/DL — SIGNIFICANT CHANGE UP (ref 0–0.2)
BILIRUB INDIRECT FLD-MCNC: >0.2 MG/DL — SIGNIFICANT CHANGE UP (ref 0–1)
BILIRUB SERPL-MCNC: 0.4 MG/DL — SIGNIFICANT CHANGE UP (ref 0.2–1.2)
BILIRUB SERPL-MCNC: 0.4 MG/DL — SIGNIFICANT CHANGE UP (ref 0.2–1.2)
BUN SERPL-MCNC: 21 MG/DL — SIGNIFICANT CHANGE UP (ref 7–23)
CALCIUM SERPL-MCNC: 8.8 MG/DL — SIGNIFICANT CHANGE UP (ref 8.4–10.5)
CHLORIDE SERPL-SCNC: 103 MMOL/L — SIGNIFICANT CHANGE UP (ref 98–107)
CO2 SERPL-SCNC: 25 MMOL/L — SIGNIFICANT CHANGE UP (ref 22–31)
CREAT SERPL-MCNC: 0.73 MG/DL — SIGNIFICANT CHANGE UP (ref 0.5–1.3)
CREAT SERPL-MCNC: 0.74 MG/DL — SIGNIFICANT CHANGE UP (ref 0.5–1.3)
EOSINOPHIL # BLD AUTO: 0.01 K/UL — SIGNIFICANT CHANGE UP (ref 0–0.5)
EOSINOPHIL NFR BLD AUTO: 0.1 % — SIGNIFICANT CHANGE UP (ref 0–6)
GLUCOSE SERPL-MCNC: 120 MG/DL — HIGH (ref 70–99)
HCT VFR BLD CALC: 38.1 % — SIGNIFICANT CHANGE UP (ref 34.5–45)
HGB BLD-MCNC: 12.5 G/DL — SIGNIFICANT CHANGE UP (ref 11.5–15.5)
IANC: 4.91 K/UL — SIGNIFICANT CHANGE UP (ref 1.5–8.5)
IMM GRANULOCYTES NFR BLD AUTO: 0.6 % — SIGNIFICANT CHANGE UP (ref 0–1.5)
LYMPHOCYTES # BLD AUTO: 1.22 K/UL — SIGNIFICANT CHANGE UP (ref 1–3.3)
LYMPHOCYTES # BLD AUTO: 17.6 % — SIGNIFICANT CHANGE UP (ref 13–44)
MAGNESIUM SERPL-MCNC: 2.8 MG/DL — HIGH (ref 1.6–2.6)
MCHC RBC-ENTMCNC: 27.9 PG — SIGNIFICANT CHANGE UP (ref 27–34)
MCHC RBC-ENTMCNC: 32.8 GM/DL — SIGNIFICANT CHANGE UP (ref 32–36)
MCV RBC AUTO: 85 FL — SIGNIFICANT CHANGE UP (ref 80–100)
MONOCYTES # BLD AUTO: 0.76 K/UL — SIGNIFICANT CHANGE UP (ref 0–0.9)
MONOCYTES NFR BLD AUTO: 10.9 % — SIGNIFICANT CHANGE UP (ref 2–14)
NEUTROPHILS # BLD AUTO: 4.91 K/UL — SIGNIFICANT CHANGE UP (ref 1.8–7.4)
NEUTROPHILS NFR BLD AUTO: 70.7 % — SIGNIFICANT CHANGE UP (ref 43–77)
NRBC # BLD: 0 /100 WBCS — SIGNIFICANT CHANGE UP
NRBC # FLD: 0 K/UL — SIGNIFICANT CHANGE UP
PHOSPHATE SERPL-MCNC: 3.2 MG/DL — SIGNIFICANT CHANGE UP (ref 2.5–4.5)
PLATELET # BLD AUTO: 372 K/UL — SIGNIFICANT CHANGE UP (ref 150–400)
POTASSIUM SERPL-MCNC: 4.9 MMOL/L — SIGNIFICANT CHANGE UP (ref 3.5–5.3)
POTASSIUM SERPL-SCNC: 4.9 MMOL/L — SIGNIFICANT CHANGE UP (ref 3.5–5.3)
PROT SERPL-MCNC: 7.2 G/DL — SIGNIFICANT CHANGE UP (ref 6–8.3)
PROT SERPL-MCNC: 7.3 G/DL — SIGNIFICANT CHANGE UP (ref 6–8.3)
RBC # BLD: 4.48 M/UL — SIGNIFICANT CHANGE UP (ref 3.8–5.2)
RBC # FLD: 13.6 % — SIGNIFICANT CHANGE UP (ref 10.3–14.5)
SODIUM SERPL-SCNC: 139 MMOL/L — SIGNIFICANT CHANGE UP (ref 135–145)
WBC # BLD: 6.95 K/UL — SIGNIFICANT CHANGE UP (ref 3.8–10.5)
WBC # FLD AUTO: 6.95 K/UL — SIGNIFICANT CHANGE UP (ref 3.8–10.5)

## 2020-12-21 PROCEDURE — 99233 SBSQ HOSP IP/OBS HIGH 50: CPT

## 2020-12-21 PROCEDURE — 99232 SBSQ HOSP IP/OBS MODERATE 35: CPT

## 2020-12-21 RX ADMIN — Medication 6 MILLIGRAM(S): at 12:45

## 2020-12-21 RX ADMIN — ENOXAPARIN SODIUM 40 MILLIGRAM(S): 100 INJECTION SUBCUTANEOUS at 05:27

## 2020-12-21 RX ADMIN — Medication 3 MILLIGRAM(S): at 22:27

## 2020-12-21 RX ADMIN — POLYETHYLENE GLYCOL 3350 17 GRAM(S): 17 POWDER, FOR SOLUTION ORAL at 12:48

## 2020-12-21 RX ADMIN — ENOXAPARIN SODIUM 40 MILLIGRAM(S): 100 INJECTION SUBCUTANEOUS at 17:15

## 2020-12-21 NOTE — PROGRESS NOTE ADULT - ATTENDING COMMENTS
Dc planning in 1-2 days once weaned off O2  plan of care d/w  on the phone( with Andorran translation with help from Dr Geller)

## 2020-12-21 NOTE — PROGRESS NOTE ADULT - PROBLEM SELECTOR PLAN 5
IMPROVE Score 3 - D-dimer was worsening but trending down today , on  Lovenox for DVT prevention,  increased  Lovenox to BID ,  constipation: bowel regimen was added , t still had no BM, will give Dulcolax suppository today - IMPROVE Score 3 - D-dimer was worsening but trending down today , on  Lovenox for DVT prevention,  increased  Lovenox to BID  - Constipation: bowel regimen was added s/p suppository w/ BM yesterday.   - Dispo pending course. 1-2 days. Wean to room air, O2 sats during ambulation.

## 2020-12-21 NOTE — PROGRESS NOTE ADULT - SUBJECTIVE AND OBJECTIVE BOX
PROGRESS NOTE:     Patient is a 60y old  Female who presents with a chief complaint of SOB due to COVID PNA (20 Dec 2020 10:56)    SUBJECTIVE / OVERNIGHT EVENTS:    ADDITIONAL REVIEW OF SYSTEMS:    MEDICATIONS  (STANDING):  dexAMETHasone  Injectable 6 milliGRAM(s) IV Push every 24 hours  enoxaparin Injectable 40 milliGRAM(s) SubCutaneous two times a day  polyethylene glycol 3350 17 Gram(s) Oral daily  senna 2 Tablet(s) Oral at bedtime    MEDICATIONS  (PRN):  acetaminophen   Tablet .. 650 milliGRAM(s) Oral every 6 hours PRN Temp greater or equal to 38C (100.4F), Mild Pain (1 - 3), Moderate Pain (4 - 6)  ALBUTerol    90 MICROgram(s) HFA Inhaler 2 Puff(s) Inhalation every 6 hours PRN Shortness of Breath and/or Wheezing  aluminum hydroxide/magnesium hydroxide/simethicone Suspension 30 milliLiter(s) Oral every 4 hours PRN Dyspepsia  bisacodyl Suppository 10 milliGRAM(s) Rectal daily PRN Constipation  guaifenesin/dextromethorphan  Syrup 10 milliLiter(s) Oral every 4 hours PRN Cough  melatonin 3 milliGRAM(s) Oral at bedtime PRN Insomnia  ondansetron Injectable 4 milliGRAM(s) IV Push every 4 hours PRN Nausea and/or Vomiting    CAPILLARY BLOOD GLUCOSE    I&O's Summary    PHYSICAL EXAM:  Vital Signs Last 24 Hrs  T(C): 36.7 (21 Dec 2020 05:45), Max: 36.8 (20 Dec 2020 12:30)  T(F): 98.1 (21 Dec 2020 05:45), Max: 98.3 (20 Dec 2020 12:30)  HR: 61 (21 Dec 2020 05:45) (55 - 65)  BP: 105/68 (21 Dec 2020 05:45) (100/65 - 109/70)  BP(mean): --  RR: 20 (21 Dec 2020 05:45) (20 - 25)  SpO2: 97% (21 Dec 2020 05:45) (92% - 100%)    PHYSICAL EXAM:  GENERAL: NAD, well developed   HEAD:  Atraumatic, Normocephalic  EYES: EOMI, PERRLA, conjunctiva and sclera clear  CHEST/LUNG: Clear to auscultation bilaterally; No wheeze  HEART: Regular rate and rhythm;   ABDOMEN: Soft, Nontender, Nondistended; Bowel sounds present  EXTREMITIES:  2+ Peripheral Pulses, No clubbing, cyanosis, or edema  NEUROLOGY: non-focal, AAOX3  SKIN: No rashes or lesions    LABS: reviewed                         12.5   6.95  )-----------( 372      ( 21 Dec 2020 07:23 )             38.1     12-21    139  |  103  |  21  ----------------------------<  120<H>  4.9   |  25  |  0.74    Ca    8.8      21 Dec 2020 07:23  Phos  3.2     12-21  Mg     2.8     12-21    TPro  7.2  /  Alb  3.4  /  TBili  0.4  /  DBili  <0.2  /  AST  23  /  ALT  29  /  AlkPhos  61  12-21    RADIOLOGY & ADDITIONAL TESTS:  Results Reviewed:   Imaging Personally Reviewed:  Electrocardiogram Personally Reviewed:    COORDINATION OF CARE:  Care Discussed with Consultants/Other Providers [Y/N]:  Prior or Outpatient Records Reviewed [Y/N]:   PROGRESS NOTE:     Patient is a 60y old  Female who presents with a chief complaint of SOB due to COVID PNA (20 Dec 2020 10:56)    SUBJECTIVE / OVERNIGHT EVENTS: Patient seen and evaluated at bedside. No acute distress evident, no overnight events. Reports feeling better than before but is at times SOB. Currently on 2 L NC. Reports gets worsening SOB during ambulation. no other complaints.     ADDITIONAL REVIEW OF SYSTEMS:    MEDICATIONS  (STANDING):  dexAMETHasone  Injectable 6 milliGRAM(s) IV Push every 24 hours  enoxaparin Injectable 40 milliGRAM(s) SubCutaneous two times a day  polyethylene glycol 3350 17 Gram(s) Oral daily  senna 2 Tablet(s) Oral at bedtime    MEDICATIONS  (PRN):  acetaminophen   Tablet .. 650 milliGRAM(s) Oral every 6 hours PRN Temp greater or equal to 38C (100.4F), Mild Pain (1 - 3), Moderate Pain (4 - 6)  ALBUTerol    90 MICROgram(s) HFA Inhaler 2 Puff(s) Inhalation every 6 hours PRN Shortness of Breath and/or Wheezing  aluminum hydroxide/magnesium hydroxide/simethicone Suspension 30 milliLiter(s) Oral every 4 hours PRN Dyspepsia  bisacodyl Suppository 10 milliGRAM(s) Rectal daily PRN Constipation  guaifenesin/dextromethorphan  Syrup 10 milliLiter(s) Oral every 4 hours PRN Cough  melatonin 3 milliGRAM(s) Oral at bedtime PRN Insomnia  ondansetron Injectable 4 milliGRAM(s) IV Push every 4 hours PRN Nausea and/or Vomiting    CAPILLARY BLOOD GLUCOSE    I&O's Summary    PHYSICAL EXAM:  Vital Signs Last 24 Hrs  T(C): 36.7 (21 Dec 2020 05:45), Max: 36.8 (20 Dec 2020 12:30)  T(F): 98.1 (21 Dec 2020 05:45), Max: 98.3 (20 Dec 2020 12:30)  HR: 61 (21 Dec 2020 05:45) (55 - 65)  BP: 105/68 (21 Dec 2020 05:45) (100/65 - 109/70)  BP(mean): --  RR: 20 (21 Dec 2020 05:45) (20 - 25)  SpO2: 97% (21 Dec 2020 05:45) (92% - 100%)    PHYSICAL EXAM:  GENERAL: NAD, well developed, middle aged woman, comfortable, but gets SOB while talking.   HEAD:  Atraumatic, Normocephalic  EYES: EOMI, PERRLA, conjunctiva and sclera clear  CHEST/LUNG: Clear to auscultation bilaterally; No wheeze  HEART: Regular rate and rhythm;   ABDOMEN: Soft, Nontender, Nondistended; Bowel sounds present  EXTREMITIES:  2+ Peripheral Pulses, No clubbing, cyanosis, or edema  NEUROLOGY: non-focal, AAOX3  SKIN: No rashes or lesions    LABS: reviewed                         12.5   6.95  )-----------( 372      ( 21 Dec 2020 07:23 )             38.1     12-21    139  |  103  |  21  ----------------------------<  120<H>  4.9   |  25  |  0.74    Ca    8.8      21 Dec 2020 07:23  Phos  3.2     12-21  Mg     2.8     12-21    TPro  7.2  /  Alb  3.4  /  TBili  0.4  /  DBili  <0.2  /  AST  23  /  ALT  29  /  AlkPhos  61  12-21    RADIOLOGY & ADDITIONAL TESTS:  Results Reviewed:   Imaging Personally Reviewed:  Electrocardiogram Personally Reviewed:    COORDINATION OF CARE:  Care Discussed with Consultants/Other Providers [Y/N]:  Prior or Outpatient Records Reviewed [Y/N]:

## 2020-12-21 NOTE — PROGRESS NOTE ADULT - ATTENDING COMMENTS
Shade Howell  Attending Physician   Division of Infectious Disease  Pager #[phone]  After 5pm/weekend or no response, call #[phone]

## 2020-12-21 NOTE — PROGRESS NOTE ADULT - SUBJECTIVE AND OBJECTIVE BOX
ABIGAIL LI 60y [id number]    Patient is a 60y old  Female who presents with a chief complaint of SOB due to COVID PNA (21 Dec 2020 10:35)      Follow Up/CC:  ID following for COVID    Interval History/ROS: no fever, improving     Allergies    No Known Allergies    Intolerances        ANTIMICROBIALS:      MEDICATIONS  (STANDING):  dexAMETHasone  Injectable 6 milliGRAM(s) IV Push every 24 hours  enoxaparin Injectable 40 milliGRAM(s) SubCutaneous two times a day  polyethylene glycol 3350 17 Gram(s) Oral daily  senna 2 Tablet(s) Oral at bedtime    MEDICATIONS  (PRN):  acetaminophen   Tablet .. 650 milliGRAM(s) Oral every 6 hours PRN Temp greater or equal to 38C (100.4F), Mild Pain (1 - 3), Moderate Pain (4 - 6)  ALBUTerol    90 MICROgram(s) HFA Inhaler 2 Puff(s) Inhalation every 6 hours PRN Shortness of Breath and/or Wheezing  aluminum hydroxide/magnesium hydroxide/simethicone Suspension 30 milliLiter(s) Oral every 4 hours PRN Dyspepsia  bisacodyl Suppository 10 milliGRAM(s) Rectal daily PRN Constipation  guaifenesin/dextromethorphan  Syrup 10 milliLiter(s) Oral every 4 hours PRN Cough  melatonin 3 milliGRAM(s) Oral at bedtime PRN Insomnia  ondansetron Injectable 4 milliGRAM(s) IV Push every 4 hours PRN Nausea and/or Vomiting        Vital Signs Last 24 Hrs  T(C): 36.8 (21 Dec 2020 10:53), Max: 36.8 (20 Dec 2020 22:00)  T(F): 98.2 (21 Dec 2020 10:53), Max: 98.2 (20 Dec 2020 22:00)  HR: 72 (21 Dec 2020 10:53) (61 - 72)  BP: 102/62 (21 Dec 2020 10:53) (102/62 - 109/70)  BP(mean): --  RR: 20 (21 Dec 2020 10:53) (20 - 20)  SpO2: 93% (21 Dec 2020 10:53) (93% - 100%)    CBC Full  -  ( 21 Dec 2020 07:23 )  WBC Count : 6.95 K/uL  RBC Count : 4.48 M/uL  Hemoglobin : 12.5 g/dL  Hematocrit : 38.1 %  Platelet Count - Automated : 372 K/uL  Mean Cell Volume : 85.0 fL  Mean Cell Hemoglobin : 27.9 pg  Mean Cell Hemoglobin Concentration : 32.8 gm/dL  Auto Neutrophil # : 4.91 K/uL  Auto Lymphocyte # : 1.22 K/uL  Auto Monocyte # : 0.76 K/uL  Auto Eosinophil # : 0.01 K/uL  Auto Basophil # : 0.01 K/uL  Auto Neutrophil % : 70.7 %  Auto Lymphocyte % : 17.6 %  Auto Monocyte % : 10.9 %  Auto Eosinophil % : 0.1 %  Auto Basophil % : 0.1 %    12-21    139  |  103  |  21  ----------------------------<  120<H>  4.9   |  25  |  0.74    Ca    8.8      21 Dec 2020 07:23  Phos  3.2     12-21  Mg     2.8     12-21    TPro  7.2  /  Alb  3.4  /  TBili  0.4  /  DBili  <0.2  /  AST  23  /  ALT  29  /  AlkPhos  61  12-21    LIVER FUNCTIONS - ( 21 Dec 2020 07:23 )  Alb: 3.4 g/dL / Pro: 7.2 g/dL / ALK PHOS: 61 U/L / ALT: 29 U/L / AST: 23 U/L / GGT: x               MICROBIOLOGY:  .Blood Blood  12-13-20   No Growth Final  --  --      .Blood Blood-Peripheral  12-11-20   No Growth Final  --  --      .Blood Blood-Peripheral  12-11-20   Growth in aerobic bottle: Staphylococcus hominis  Coag Negative Staphylococcus  Single set isolate, possible contaminant. Contact  Microbiology if susceptibility testing clinically  indicated.  "Due to technical problems, Proteus sp. will Not be reported as part of  the BCID panel until further notice"  ***Blood Panel PCR results on this specimen are available  approximately 3 hours after the Gram stain result.***  Gram stain, PCR, and/or culture results may not always  correspond due to difference in methodologies.  ************************************************************  This PCR assay was performed using [employer].  The following targets are tested for: Enterococcus,  vancomycin resistant enterococci, Listeria monocytogenes,  coagulase negative staphylococci, S. aureus,  methicillin resistant S. aureus, Streptococcus agalactiae  (Group B), S. pneumoniae, S. pyogenes (Group A),  Acinetobacter baumannii, Enterobacter cloacae, E. coli,  Klebsiella oxytoca, K. pneumoniae, Proteus sp.,  Serratia marcescens, Haemophilus influenzae,  Neisseria meningitidis, Pseudomonas aeruginosa, Candida  albicans, C. glabrata, C krusei, C parapsilosis,  C. tropicalis and the KPC resistance gene.  --  Blood Culture PCR        RADIOLOGY    < from: Xray Chest 1 View- PORTABLE-Urgent (Xray Chest 1 View- PORTABLE-Urgent .) (12.11.20 @ 12:08) >  Vague increased markings and wispy hazy groundglass opacities suggested in peripheral left midlung and peripheral right lower lung suspicious for evolving multifocal infectious process including from potential atypical viral etiologies, follow-up suggested otherwise chest CT would be helpful for more definitive evaluation. No pleural effusions or pneumothorax.    Heart size and mediastinal width inaccurately assessed on the projection.    Trachea midline.    Unremarkable osseous structures.    < end of copied text >

## 2020-12-21 NOTE — PROGRESS NOTE ADULT - PROBLEM SELECTOR PLAN 1
-supportive care  -maintain oxygenation saturation  -monitor fever curve  -continue airborne/contact isolation  -on dexamethasone   -s/p remdesevir   -monitor pulse ox  -contact/airborne precautions   -wean O2 as tolerated  -trend inflammatory markers

## 2020-12-21 NOTE — PROGRESS NOTE ADULT - PROBLEM SELECTOR PLAN 1
1 out of 2 sets noted to have Staph hominis. Suspect contaminant. Noted mild fever but can be attributed to COVID-19 infection. Pt remains hemodynamically stable and with normal WBC  - repeat blood cx  NGTD   - defer further abx for now   -ID  f/u  appreciated 1 out of 2 sets noted to have Staph hominis. Suspect contaminant. Noted mild fever but can be attributed to COVID-19 infection. Pt remains hemodynamically stable and with normal WBC.   - Repeat blood cx  NGTD   - Defer further abx for now   - ID  f/u  appreciated

## 2020-12-21 NOTE — PROGRESS NOTE ADULT - PROBLEM SELECTOR PLAN 4
Plt 121 likely in setting of COVID infection on admission . Spontaneously resolved. - Plt 121 likely in setting of COVID infection on admission . Spontaneously resolved.

## 2020-12-21 NOTE — PROGRESS NOTE ADULT - PROBLEM SELECTOR PLAN 2
COVID PCR + on 12/7 (as OP) and 12/11 ([facility]).  CXR with hazy opacities.  pt symptomatic. In ED given decadron 6 mg x1.  - Pt saturating  % on 2L NC   -ID eval appreciated, recommend staring Remdesivir and dexamethasone    -completed  Remdesivir day5/5 and [id number] day 7 /10   -Monitor inflammatory markers, LFTs and renal fn closely , trending down   - c/w supportive care with cough suppressants and albuterol MDIs  - PRN Tylenol for  body aches and headache.  -incentive spirometry COVID PCR + on 12/7 (as OP) and 12/11 ([facility]). CXR w hazy opacities. Symptomatic.   - Pt saturating  % on 2L NC   - ID eval appreciated, recommend staring Remdesivir and Dexamethasone    - Completed Remdesivir day5/5 and Dexamethasone day 7 /10   - Monitor inflammatory markers, LFTs and renal fxn closely , trending down   - c/w supportive care with cough suppressants and Albuterol MDIs  - PRN Tylenol for  body aches and headache.  - Incentive spirometry, Chest PT as tolerated.

## 2020-12-22 LAB
ALBUMIN SERPL ELPH-MCNC: 3.5 G/DL — SIGNIFICANT CHANGE UP (ref 3.3–5)
ALP SERPL-CCNC: 60 U/L — SIGNIFICANT CHANGE UP (ref 40–120)
ALT FLD-CCNC: 30 U/L — SIGNIFICANT CHANGE UP (ref 4–33)
ANION GAP SERPL CALC-SCNC: 9 MMOL/L — SIGNIFICANT CHANGE UP (ref 7–14)
AST SERPL-CCNC: 24 U/L — SIGNIFICANT CHANGE UP (ref 4–32)
BILIRUB SERPL-MCNC: 0.4 MG/DL — SIGNIFICANT CHANGE UP (ref 0.2–1.2)
BUN SERPL-MCNC: 20 MG/DL — SIGNIFICANT CHANGE UP (ref 7–23)
CALCIUM SERPL-MCNC: 9 MG/DL — SIGNIFICANT CHANGE UP (ref 8.4–10.5)
CHLORIDE SERPL-SCNC: 102 MMOL/L — SIGNIFICANT CHANGE UP (ref 98–107)
CO2 SERPL-SCNC: 27 MMOL/L — SIGNIFICANT CHANGE UP (ref 22–31)
CREAT SERPL-MCNC: 0.75 MG/DL — SIGNIFICANT CHANGE UP (ref 0.5–1.3)
GLUCOSE SERPL-MCNC: 109 MG/DL — HIGH (ref 70–99)
HCT VFR BLD CALC: 39.8 % — SIGNIFICANT CHANGE UP (ref 34.5–45)
HGB BLD-MCNC: 12.7 G/DL — SIGNIFICANT CHANGE UP (ref 11.5–15.5)
MAGNESIUM SERPL-MCNC: 2.8 MG/DL — HIGH (ref 1.6–2.6)
MCHC RBC-ENTMCNC: 27.7 PG — SIGNIFICANT CHANGE UP (ref 27–34)
MCHC RBC-ENTMCNC: 31.9 GM/DL — LOW (ref 32–36)
MCV RBC AUTO: 86.9 FL — SIGNIFICANT CHANGE UP (ref 80–100)
NRBC # BLD: 0 /100 WBCS — SIGNIFICANT CHANGE UP
NRBC # FLD: 0 K/UL — SIGNIFICANT CHANGE UP
PHOSPHATE SERPL-MCNC: 3.6 MG/DL — SIGNIFICANT CHANGE UP (ref 2.5–4.5)
PLATELET # BLD AUTO: 364 K/UL — SIGNIFICANT CHANGE UP (ref 150–400)
POTASSIUM SERPL-MCNC: 5 MMOL/L — SIGNIFICANT CHANGE UP (ref 3.5–5.3)
POTASSIUM SERPL-SCNC: 5 MMOL/L — SIGNIFICANT CHANGE UP (ref 3.5–5.3)
PROT SERPL-MCNC: 7.2 G/DL — SIGNIFICANT CHANGE UP (ref 6–8.3)
RBC # BLD: 4.58 M/UL — SIGNIFICANT CHANGE UP (ref 3.8–5.2)
RBC # FLD: 13.8 % — SIGNIFICANT CHANGE UP (ref 10.3–14.5)
SODIUM SERPL-SCNC: 138 MMOL/L — SIGNIFICANT CHANGE UP (ref 135–145)
WBC # BLD: 6.69 K/UL — SIGNIFICANT CHANGE UP (ref 3.8–10.5)
WBC # FLD AUTO: 6.69 K/UL — SIGNIFICANT CHANGE UP (ref 3.8–10.5)

## 2020-12-22 PROCEDURE — 99232 SBSQ HOSP IP/OBS MODERATE 35: CPT

## 2020-12-22 PROCEDURE — 99233 SBSQ HOSP IP/OBS HIGH 50: CPT

## 2020-12-22 RX ADMIN — Medication 650 MILLIGRAM(S): at 21:14

## 2020-12-22 RX ADMIN — ENOXAPARIN SODIUM 40 MILLIGRAM(S): 100 INJECTION SUBCUTANEOUS at 17:54

## 2020-12-22 RX ADMIN — ENOXAPARIN SODIUM 40 MILLIGRAM(S): 100 INJECTION SUBCUTANEOUS at 05:38

## 2020-12-22 RX ADMIN — Medication 650 MILLIGRAM(S): at 21:44

## 2020-12-22 RX ADMIN — Medication 6 MILLIGRAM(S): at 13:45

## 2020-12-22 RX ADMIN — Medication 3 MILLIGRAM(S): at 21:27

## 2020-12-22 RX ADMIN — Medication 30 MILLILITER(S): at 18:29

## 2020-12-22 RX ADMIN — SENNA PLUS 2 TABLET(S): 8.6 TABLET ORAL at 21:14

## 2020-12-22 NOTE — PROGRESS NOTE ADULT - SUBJECTIVE AND OBJECTIVE BOX
ABIGAIL LI 60y [id number]    Patient is a 60y old  Female who presents with a chief complaint of SOB due to COVID PNA (22 Dec 2020 11:36)      Follow Up/CC:  ID following for COVID    Interval History/ROS: no fever, cough+, fatigue+    Allergies    No Known Allergies    Intolerances        ANTIMICROBIALS:      MEDICATIONS  (STANDING):  dexAMETHasone  Injectable 6 milliGRAM(s) IV Push every 24 hours  enoxaparin Injectable 40 milliGRAM(s) SubCutaneous two times a day  polyethylene glycol 3350 17 Gram(s) Oral daily  senna 2 Tablet(s) Oral at bedtime    MEDICATIONS  (PRN):  acetaminophen   Tablet .. 650 milliGRAM(s) Oral every 6 hours PRN Temp greater or equal to 38C (100.4F), Mild Pain (1 - 3), Moderate Pain (4 - 6)  ALBUTerol    90 MICROgram(s) HFA Inhaler 2 Puff(s) Inhalation every 6 hours PRN Shortness of Breath and/or Wheezing  aluminum hydroxide/magnesium hydroxide/simethicone Suspension 30 milliLiter(s) Oral every 4 hours PRN Dyspepsia  bisacodyl Suppository 10 milliGRAM(s) Rectal daily PRN Constipation  guaifenesin/dextromethorphan  Syrup 10 milliLiter(s) Oral every 4 hours PRN Cough  melatonin 3 milliGRAM(s) Oral at bedtime PRN Insomnia  ondansetron Injectable 4 milliGRAM(s) IV Push every 4 hours PRN Nausea and/or Vomiting        Vital Signs Last 24 Hrs  T(C): 36.7 (22 Dec 2020 14:19), Max: 37 (21 Dec 2020 18:10)  T(F): 98 (22 Dec 2020 14:19), Max: 98.6 (21 Dec 2020 18:10)  HR: 63 (22 Dec 2020 14:19) (60 - 87)  BP: 102/58 (22 Dec 2020 14:19) (102/58 - 108/52)  BP(mean): --  RR: 17 (22 Dec 2020 14:19) (17 - 18)  SpO2: 94% (22 Dec 2020 16:26) (94% - 98%)    CBC Full  -  ( 22 Dec 2020 07:17 )  WBC Count : 6.69 K/uL  RBC Count : 4.58 M/uL  Hemoglobin : 12.7 g/dL  Hematocrit : 39.8 %  Platelet Count - Automated : 364 K/uL  Mean Cell Volume : 86.9 fL  Mean Cell Hemoglobin : 27.7 pg  Mean Cell Hemoglobin Concentration : 31.9 gm/dL  Auto Neutrophil # : x  Auto Lymphocyte # : x  Auto Monocyte # : x  Auto Eosinophil # : x  Auto Basophil # : x  Auto Neutrophil % : x  Auto Lymphocyte % : x  Auto Monocyte % : x  Auto Eosinophil % : x  Auto Basophil % : x    12-22    138  |  102  |  20  ----------------------------<  109<H>  5.0   |  27  |  0.75    Ca    9.0      22 Dec 2020 07:17  Phos  3.6     12-22  Mg     2.8     12-22    TPro  7.2  /  Alb  3.5  /  TBili  0.4  /  DBili  x   /  AST  24  /  ALT  30  /  AlkPhos  60  12-22    LIVER FUNCTIONS - ( 22 Dec 2020 07:17 )  Alb: 3.5 g/dL / Pro: 7.2 g/dL / ALK PHOS: 60 U/L / ALT: 30 U/L / AST: 24 U/L / GGT: x               MICROBIOLOGY:  .Blood Blood  12-13-20   No Growth Final  --  --      .Blood Blood-Peripheral  12-11-20   No Growth Final  --  --      .Blood Blood-Peripheral  12-11-20   Growth in aerobic bottle: Staphylococcus hominis  Coag Negative Staphylococcus  Single set isolate, possible contaminant. Contact  Microbiology if susceptibility testing clinically  indicated.  "Due to technical problems, Proteus sp. will Not be reported as part of  the BCID panel until further notice"  ***Blood Panel PCR results on this specimen are available  approximately 3 hours after the Gram stain result.***  Gram stain, PCR, and/or culture results may not always  correspond due to difference in methodologies.  ************************************************************  This PCR assay was performed using [facility].  The following targets are tested for: Enterococcus,  vancomycin resistant enterococci, Listeria monocytogenes,  coagulase negative staphylococci, S. aureus,  methicillin resistant S. aureus, Streptococcus agalactiae  (Group B), S. pneumoniae, S. pyogenes (Group A),  Acinetobacter baumannii, Enterobacter cloacae, E. coli,  Klebsiella oxytoca, K. pneumoniae, Proteus sp.,  Serratia marcescens, Haemophilus influenzae,  Neisseria meningitidis, Pseudomonas aeruginosa, Candida  albicans, C. glabrata, C krusei, C parapsilosis,  C. tropicalis and the KPC resistance gene.  --  Blood Culture PCR      RADIOLOGY    < from: Xray Chest 1 View- PORTABLE-Urgent (Xray Chest 1 View- PORTABLE-Urgent .) (12.11.20 @ 12:08) >  Vague increased markings and wispy hazy groundglass opacities suggested in peripheral left midlung and peripheral right lower lung suspicious for evolving multifocal infectious process including from potential atypical viral etiologies, follow-up suggested otherwise chest CT would be helpful for more definitive evaluation. No pleural effusions or pneumothorax.    Heart size and mediastinal width inaccurately assessed on the projection.    Trachea midline.    Unremarkable osseous structures.    < end of copied text >

## 2020-12-22 NOTE — PROGRESS NOTE ADULT - ATTENDING COMMENTS
Dc planning in 1-2 days once weaned off O2  plan of care d/w  on the phone( with Dominican translation with help from Dr Geller)

## 2020-12-22 NOTE — PROGRESS NOTE ADULT - SUBJECTIVE AND OBJECTIVE BOX
PROGRESS NOTE:     Patient is a 60y old  Female who presents with a chief complaint of SOB due to COVID PNA (21 Dec 2020 17:01)      SUBJECTIVE / OVERNIGHT EVENTS:    ADDITIONAL REVIEW OF SYSTEMS:    MEDICATIONS  (STANDING):  dexAMETHasone  Injectable 6 milliGRAM(s) IV Push every 24 hours  enoxaparin Injectable 40 milliGRAM(s) SubCutaneous two times a day  polyethylene glycol 3350 17 Gram(s) Oral daily  senna 2 Tablet(s) Oral at bedtime    MEDICATIONS  (PRN):  acetaminophen   Tablet .. 650 milliGRAM(s) Oral every 6 hours PRN Temp greater or equal to 38C (100.4F), Mild Pain (1 - 3), Moderate Pain (4 - 6)  ALBUTerol    90 MICROgram(s) HFA Inhaler 2 Puff(s) Inhalation every 6 hours PRN Shortness of Breath and/or Wheezing  aluminum hydroxide/magnesium hydroxide/simethicone Suspension 30 milliLiter(s) Oral every 4 hours PRN Dyspepsia  bisacodyl Suppository 10 milliGRAM(s) Rectal daily PRN Constipation  guaifenesin/dextromethorphan  Syrup 10 milliLiter(s) Oral every 4 hours PRN Cough  melatonin 3 milliGRAM(s) Oral at bedtime PRN Insomnia  ondansetron Injectable 4 milliGRAM(s) IV Push every 4 hours PRN Nausea and/or Vomiting      CAPILLARY BLOOD GLUCOSE        I&O's Summary      PHYSICAL EXAM:  Vital Signs Last 24 Hrs  T(C): 36.4 (22 Dec 2020 05:23), Max: 37 (21 Dec 2020 18:10)  T(F): 97.6 (22 Dec 2020 05:23), Max: 98.6 (21 Dec 2020 18:10)  HR: 87 (22 Dec 2020 05:23) (60 - 87)  BP: 103/62 (22 Dec 2020 05:23) (103/62 - 108/52)  BP(mean): --  RR: 18 (22 Dec 2020 05:23) (18 - 18)  SpO2: 98% (22 Dec 2020 05:23) (97% - 98%)        LABS: reviewed                         12.7   6.69  )-----------( 364      ( 22 Dec 2020 07:17 )             39.8     12-22    138  |  102  |  20  ----------------------------<  109<H>  5.0   |  27  |  0.75    Ca    9.0      22 Dec 2020 07:17  Phos  3.6     12-22  Mg     2.8     12-22    TPro  7.2  /  Alb  3.5  /  TBili  0.4  /  DBili  x   /  AST  24  /  ALT  30  /  AlkPhos  60  12-22    RADIOLOGY & ADDITIONAL TESTS:  Results Reviewed:   Imaging Personally Reviewed:  Electrocardiogram Personally Reviewed:    COORDINATION OF CARE:  Care Discussed with Consultants/Other Providers [Y/N]:  Prior or Outpatient Records Reviewed [Y/N]:   PROGRESS NOTE:     Patient is a 60y old  Female who presents with a chief complaint of SOB due to COVID PNA (21 Dec 2020 17:01)    SUBJECTIVE / OVERNIGHT EVENTS: Patient seen and evaluated at bedside. No acute distress evident, no overnight events reported. States that she feels well, reports SOB when she moves too much and during her coughing episodes. But otherwise denies any sob, chest pain, abdominal pain, nausea, vomiting.    ADDITIONAL REVIEW OF SYSTEMS:    MEDICATIONS  (STANDING):  dexAMETHasone  Injectable 6 milliGRAM(s) IV Push every 24 hours  enoxaparin Injectable 40 milliGRAM(s) SubCutaneous two times a day  polyethylene glycol 3350 17 Gram(s) Oral daily  senna 2 Tablet(s) Oral at bedtime    MEDICATIONS  (PRN):  acetaminophen   Tablet .. 650 milliGRAM(s) Oral every 6 hours PRN Temp greater or equal to 38C (100.4F), Mild Pain (1 - 3), Moderate Pain (4 - 6)  ALBUTerol    90 MICROgram(s) HFA Inhaler 2 Puff(s) Inhalation every 6 hours PRN Shortness of Breath and/or Wheezing  aluminum hydroxide/magnesium hydroxide/simethicone Suspension 30 milliLiter(s) Oral every 4 hours PRN Dyspepsia  bisacodyl Suppository 10 milliGRAM(s) Rectal daily PRN Constipation  guaifenesin/dextromethorphan  Syrup 10 milliLiter(s) Oral every 4 hours PRN Cough  melatonin 3 milliGRAM(s) Oral at bedtime PRN Insomnia  ondansetron Injectable 4 milliGRAM(s) IV Push every 4 hours PRN Nausea and/or Vomiting    CAPILLARY BLOOD GLUCOSE    I&O's Summary    PHYSICAL EXAM:  Vital Signs Last 24 Hrs  T(C): 36.4 (22 Dec 2020 05:23), Max: 37 (21 Dec 2020 18:10)  T(F): 97.6 (22 Dec 2020 05:23), Max: 98.6 (21 Dec 2020 18:10)  HR: 87 (22 Dec 2020 05:23) (60 - 87)  BP: 103/62 (22 Dec 2020 05:23) (103/62 - 108/52)  BP(mean): --  RR: 18 (22 Dec 2020 05:23) (18 - 18)  SpO2: 98% (22 Dec 2020 05:23) (97% - 98%)    PHYSICAL EXAM:  GENERAL: NAD, well developed, middle aged woman, conversational dyspnea+.   HEAD:  Atraumatic, Normocephalic  EYES: EOMI, PERRLA, conjunctiva and sclera clear  CHEST/LUNG: Clear to auscultation bilaterally; No wheeze  HEART: Regular rate and rhythm;   ABDOMEN: Soft, Nontender, Nondistended; Bowel sounds present  EXTREMITIES:  2+ Peripheral Pulses, No clubbing, cyanosis, or edema  NEUROLOGY: non-focal, AAOX3  SKIN: No rashes or lesions    LABS: reviewed                         12.7   6.69  )-----------( 364      ( 22 Dec 2020 07:17 )             39.8     12-22    138  |  102  |  20  ----------------------------<  109<H>  5.0   |  27  |  0.75    Ca    9.0      22 Dec 2020 07:17  Phos  3.6     12-22  Mg     2.8     12-22    TPro  7.2  /  Alb  3.5  /  TBili  0.4  /  DBili  x   /  AST  24  /  ALT  30  /  AlkPhos  60  12-22    RADIOLOGY & ADDITIONAL TESTS:  Results Reviewed:   Imaging Personally Reviewed:  Electrocardiogram Personally Reviewed:    COORDINATION OF CARE:  Care Discussed with Consultants/Other Providers [Y/N]:  Prior or Outpatient Records Reviewed [Y/N]:

## 2020-12-22 NOTE — PROGRESS NOTE ADULT - PROBLEM SELECTOR PLAN 1
- Blood cultures: 1 out of 2 sets noted to have Staph hominis. Suspect contaminant. - Noted mild fever but can be attributed to COVID-19 infection.   - Pt remains hemodynamically stable and with normal WBC.   - Repeat blood cx NGTD. Defer further abx for now, ID f/u appreciated

## 2020-12-22 NOTE — PROGRESS NOTE ADULT - ATTENDING COMMENTS
Shade Howell  Attending Physician   Division of Infectious Disease  Pager #[phone]  After 5pm/weekend or no response, call #[phone]

## 2020-12-22 NOTE — PROGRESS NOTE ADULT - PROBLEM SELECTOR PLAN 5
- IMPROVE Score 3 - on Lovenox SQ for DVT prevention.   - D-dimer was worsening therefore increased Lovenox 40mg SQ to BID  - Constipation: bowel regimen was added s/p suppository w/ BM yesterday.   - Dispo pending course. 1-2 days. Wean to room air, O2 sats during ambulation.

## 2020-12-22 NOTE — PROGRESS NOTE ADULT - PROBLEM SELECTOR PLAN 2
- COVID PCR + on 12/7 (as OP), 12/11 ([facility]). CXR w hazy opacities. Symptomatic.   - Pt saturating  % on 2L NC. TITRATE OFF AS TOLERATED.   - ONCE ON ROOM AIR, CHECK O2 DURING AMBULATION to determine ?home O2.   - ID eval appreciated, recommend staring Remdesivir and Dexamethasone    - Completed Remdesivir day 5/5 and Dexamethasone day 8/10   - Monitor inflammatory markers, LFTs and renal function closely, trending down   - c/w supportive care with cough suppressants and Albuterol MDIs  - PRN Tylenol for  body aches and headache.  - Incentive spirometry, Chest PT as tolerated.

## 2020-12-23 LAB
ALBUMIN SERPL ELPH-MCNC: 3.6 G/DL — SIGNIFICANT CHANGE UP (ref 3.3–5)
ALP SERPL-CCNC: 64 U/L — SIGNIFICANT CHANGE UP (ref 40–120)
ALT FLD-CCNC: 29 U/L — SIGNIFICANT CHANGE UP (ref 4–33)
ANION GAP SERPL CALC-SCNC: 13 MMOL/L — SIGNIFICANT CHANGE UP (ref 7–14)
AST SERPL-CCNC: 21 U/L — SIGNIFICANT CHANGE UP (ref 4–32)
BASOPHILS # BLD AUTO: 0.01 K/UL — SIGNIFICANT CHANGE UP (ref 0–0.2)
BASOPHILS NFR BLD AUTO: 0.1 % — SIGNIFICANT CHANGE UP (ref 0–2)
BILIRUB SERPL-MCNC: 0.3 MG/DL — SIGNIFICANT CHANGE UP (ref 0.2–1.2)
BUN SERPL-MCNC: 19 MG/DL — SIGNIFICANT CHANGE UP (ref 7–23)
CALCIUM SERPL-MCNC: 9.1 MG/DL — SIGNIFICANT CHANGE UP (ref 8.4–10.5)
CHLORIDE SERPL-SCNC: 101 MMOL/L — SIGNIFICANT CHANGE UP (ref 98–107)
CO2 SERPL-SCNC: 22 MMOL/L — SIGNIFICANT CHANGE UP (ref 22–31)
CREAT SERPL-MCNC: 0.69 MG/DL — SIGNIFICANT CHANGE UP (ref 0.5–1.3)
CRP SERPL-MCNC: <4 MG/L — SIGNIFICANT CHANGE UP
D DIMER BLD IA.RAPID-MCNC: 854 NG/ML DDU — HIGH
EOSINOPHIL # BLD AUTO: 0.01 K/UL — SIGNIFICANT CHANGE UP (ref 0–0.5)
EOSINOPHIL NFR BLD AUTO: 0.1 % — SIGNIFICANT CHANGE UP (ref 0–6)
FERRITIN SERPL-MCNC: 254 NG/ML — HIGH (ref 15–150)
GLUCOSE SERPL-MCNC: 114 MG/DL — HIGH (ref 70–99)
HCT VFR BLD CALC: 38.6 % — SIGNIFICANT CHANGE UP (ref 34.5–45)
HGB BLD-MCNC: 12.5 G/DL — SIGNIFICANT CHANGE UP (ref 11.5–15.5)
IANC: 5.33 K/UL — SIGNIFICANT CHANGE UP (ref 1.5–8.5)
IMM GRANULOCYTES NFR BLD AUTO: 0.5 % — SIGNIFICANT CHANGE UP (ref 0–1.5)
LYMPHOCYTES # BLD AUTO: 1.17 K/UL — SIGNIFICANT CHANGE UP (ref 1–3.3)
LYMPHOCYTES # BLD AUTO: 15.7 % — SIGNIFICANT CHANGE UP (ref 13–44)
MAGNESIUM SERPL-MCNC: 2.9 MG/DL — HIGH (ref 1.6–2.6)
MCHC RBC-ENTMCNC: 27.4 PG — SIGNIFICANT CHANGE UP (ref 27–34)
MCHC RBC-ENTMCNC: 32.4 GM/DL — SIGNIFICANT CHANGE UP (ref 32–36)
MCV RBC AUTO: 84.6 FL — SIGNIFICANT CHANGE UP (ref 80–100)
MONOCYTES # BLD AUTO: 0.89 K/UL — SIGNIFICANT CHANGE UP (ref 0–0.9)
MONOCYTES NFR BLD AUTO: 11.9 % — SIGNIFICANT CHANGE UP (ref 2–14)
NEUTROPHILS # BLD AUTO: 5.33 K/UL — SIGNIFICANT CHANGE UP (ref 1.8–7.4)
NEUTROPHILS NFR BLD AUTO: 71.7 % — SIGNIFICANT CHANGE UP (ref 43–77)
NRBC # BLD: 0 /100 WBCS — SIGNIFICANT CHANGE UP
NRBC # FLD: 0 K/UL — SIGNIFICANT CHANGE UP
PHOSPHATE SERPL-MCNC: 3.2 MG/DL — SIGNIFICANT CHANGE UP (ref 2.5–4.5)
PLATELET # BLD AUTO: 367 K/UL — SIGNIFICANT CHANGE UP (ref 150–400)
POTASSIUM SERPL-MCNC: 4.8 MMOL/L — SIGNIFICANT CHANGE UP (ref 3.5–5.3)
POTASSIUM SERPL-SCNC: 4.8 MMOL/L — SIGNIFICANT CHANGE UP (ref 3.5–5.3)
PROT SERPL-MCNC: 7.3 G/DL — SIGNIFICANT CHANGE UP (ref 6–8.3)
RBC # BLD: 4.56 M/UL — SIGNIFICANT CHANGE UP (ref 3.8–5.2)
RBC # FLD: 13.9 % — SIGNIFICANT CHANGE UP (ref 10.3–14.5)
SODIUM SERPL-SCNC: 136 MMOL/L — SIGNIFICANT CHANGE UP (ref 135–145)
WBC # BLD: 7.45 K/UL — SIGNIFICANT CHANGE UP (ref 3.8–10.5)
WBC # FLD AUTO: 7.45 K/UL — SIGNIFICANT CHANGE UP (ref 3.8–10.5)

## 2020-12-23 PROCEDURE — 99232 SBSQ HOSP IP/OBS MODERATE 35: CPT

## 2020-12-23 PROCEDURE — 99233 SBSQ HOSP IP/OBS HIGH 50: CPT

## 2020-12-23 RX ORDER — LANOLIN ALCOHOL/MO/W.PET/CERES
3 CREAM (GRAM) TOPICAL ONCE
Refills: 0 | Status: COMPLETED | OUTPATIENT
Start: 2020-12-23 | End: 2020-12-23

## 2020-12-23 RX ORDER — ACETAMINOPHEN 500 MG
2 TABLET ORAL
Qty: 240 | Refills: 0
Start: 2020-12-23 | End: 2021-01-21

## 2020-12-23 RX ORDER — ALBUTEROL 90 UG/1
2 AEROSOL, METERED ORAL
Qty: 1 | Refills: 0
Start: 2020-12-23 | End: 2021-01-21

## 2020-12-23 RX ORDER — GUAIFENESIN/DEXTROMETHORPHAN 600MG-30MG
10 TABLET, EXTENDED RELEASE 12 HR ORAL
Qty: 1800 | Refills: 0
Start: 2020-12-23 | End: 2021-01-21

## 2020-12-23 RX ADMIN — Medication 10 MILLILITER(S): at 11:11

## 2020-12-23 RX ADMIN — POLYETHYLENE GLYCOL 3350 17 GRAM(S): 17 POWDER, FOR SOLUTION ORAL at 11:10

## 2020-12-23 RX ADMIN — Medication 3 MILLIGRAM(S): at 22:22

## 2020-12-23 RX ADMIN — ENOXAPARIN SODIUM 40 MILLIGRAM(S): 100 INJECTION SUBCUTANEOUS at 05:08

## 2020-12-23 RX ADMIN — Medication 10 MILLILITER(S): at 23:53

## 2020-12-23 RX ADMIN — ENOXAPARIN SODIUM 40 MILLIGRAM(S): 100 INJECTION SUBCUTANEOUS at 17:13

## 2020-12-23 RX ADMIN — Medication 6 MILLIGRAM(S): at 11:10

## 2020-12-23 NOTE — PROGRESS NOTE ADULT - CARDIOVASCULAR SYMPTOMS
dyspnea on exertion

## 2020-12-23 NOTE — PROGRESS NOTE ADULT - RS GEN PE MLT RESP DETAILS PC
clear to auscultation bilaterally/no wheezes
respirations non-labored/clear to auscultation bilaterally/no wheezes
clear to auscultation bilaterally/no wheezes

## 2020-12-23 NOTE — PROGRESS NOTE ADULT - NEGATIVE OPHTHALMOLOGIC SYMPTOMS
no diplopia/no pain L/no pain R
no diplopia/no pain R/no pain L
no diplopia/no pain L/no pain R

## 2020-12-23 NOTE — PROGRESS NOTE ADULT - PROBLEM SELECTOR PLAN 2
- COVID PCR + on 12/7 (as OP), 12/11 ([facility]). CXR w hazy opacities. Symptomatic.   - Pt saturating 94 % on room air. Continue to monitor as tolerated.   - ONCE ON ROOM AIR, CHECK O2 DURING AMBULATION to determine ?home O2.   - ID eval appreciated, recommend staring Remdesivir and Dexamethasone    - Completed Remdesivir day 5/5 and Dexamethasone day 8/10   - Monitor inflammatory markers, LFTs and renal function closely, trending down   - c/w supportive care with cough suppressants and Albuterol MDIs.  - PRN Tylenol for body aches and headache.  - Incentive spirometry, Chest PT as tolerated.

## 2020-12-23 NOTE — PROGRESS NOTE ADULT - SUBJECTIVE AND OBJECTIVE BOX
PROGRESS NOTE:     Patient is a 60y old  Female who presents with a chief complaint of SOB due to COVID PNA (23 Dec 2020 09:52)    SUBJECTIVE / OVERNIGHT EVENTS: Patient seen and evaluated at bedside no acute distress evident, no overnight events. Reports feeling better- but is SOB at times particularly while ambulating. Denies any chest pain, abdominal pain, nausea, vomiting, fever, chills.     ADDITIONAL REVIEW OF SYSTEMS:    MEDICATIONS  (STANDING):  enoxaparin Injectable 40 milliGRAM(s) SubCutaneous two times a day  polyethylene glycol 3350 17 Gram(s) Oral daily  senna 2 Tablet(s) Oral at bedtime    MEDICATIONS  (PRN):  acetaminophen   Tablet .. 650 milliGRAM(s) Oral every 6 hours PRN Temp greater or equal to 38C (100.4F), Mild Pain (1 - 3), Moderate Pain (4 - 6)  ALBUTerol    90 MICROgram(s) HFA Inhaler 2 Puff(s) Inhalation every 6 hours PRN Shortness of Breath and/or Wheezing  aluminum hydroxide/magnesium hydroxide/simethicone Suspension 30 milliLiter(s) Oral every 4 hours PRN Dyspepsia  bisacodyl Suppository 10 milliGRAM(s) Rectal daily PRN Constipation  guaifenesin/dextromethorphan  Syrup 10 milliLiter(s) Oral every 4 hours PRN Cough  melatonin 3 milliGRAM(s) Oral at bedtime PRN Insomnia  ondansetron Injectable 4 milliGRAM(s) IV Push every 4 hours PRN Nausea and/or Vomiting    CAPILLARY BLOOD GLUCOSE    I&O's Summary    PHYSICAL EXAM:  Vital Signs Last 24 Hrs  T(C): 36.7 (23 Dec 2020 14:10), Max: 36.8 (22 Dec 2020 21:13)  T(F): 98.1 (23 Dec 2020 14:10), Max: 98.2 (22 Dec 2020 21:13)  HR: 72 (23 Dec 2020 14:10) (63 - 88)  BP: 98/57 (23 Dec 2020 14:10) (98/57 - 111/57)  BP(mean): --  RR: 18 (23 Dec 2020 14:10) (18 - 18)  SpO2: 94% (23 Dec 2020 14:15) (94% - 100%)    PHYSICAL EXAM:  GENERAL: NAD, well developed, middle aged woman, conversational dyspnea+.   HEAD:  Atraumatic, Normocephalic  EYES: EOMI, PERRLA, conjunctiva and sclera clear  CHEST/LUNG: Clear to auscultation bilaterally; No wheeze  HEART: Regular rate and rhythm;   ABDOMEN: Soft, Nontender, Nondistended; Bowel sounds present  EXTREMITIES:  2+ Peripheral Pulses, No clubbing, cyanosis, or edema  NEUROLOGY: non-focal, AAOX3  SKIN: No rashes or lesions    LABS: reviewed                         12.5   7.45  )-----------( 367      ( 23 Dec 2020 08:04 )             38.6     12-23    136  |  101  |  19  ----------------------------<  114<H>  4.8   |  22  |  0.69    Ca    9.1      23 Dec 2020 08:04  Phos  3.2     12-23  Mg     2.9     12-23    TPro  7.3  /  Alb  3.6  /  TBili  0.3  /  DBili  x   /  AST  21  /  ALT  29  /  AlkPhos  64  12-23    RADIOLOGY & ADDITIONAL TESTS:  Results Reviewed:   Imaging Personally Reviewed:  Electrocardiogram Personally Reviewed:    COORDINATION OF CARE:  Care Discussed with Consultants/Other Providers [Y/N]:  Prior or Outpatient Records Reviewed [Y/N]:

## 2020-12-23 NOTE — PROGRESS NOTE ADULT - MS EXT PE MLT D E PC
no cyanosis
no cyanosis
no cyanosis/no pedal edema
no cyanosis/no pedal edema
no cyanosis
no cyanosis/no pedal edema

## 2020-12-23 NOTE — PROGRESS NOTE ADULT - NEUROLOGICAL DETAILS
alert and oriented x 3/responds to verbal commands
responds to verbal commands
alert and oriented x 3/responds to verbal commands
alert and oriented x 3/responds to verbal commands

## 2020-12-23 NOTE — PROGRESS NOTE ADULT - PROBLEM SELECTOR PLAN 1
-supportive care  -maintain oxygenation saturation  -monitor fever curve  -continue airborne/contact isolation  -on dexamethasone   -s/p remdesevir   -monitor pulse ox  -contact/airborne precautions   -trend inflammatory markers  -DC planning

## 2020-12-23 NOTE — PROGRESS NOTE ADULT - CONSTITUTIONAL DETAILS
tired
no distress/obese
obese/on NC 2L/no distress
on NC/no distress
no distress
on NC 2L/no distress

## 2020-12-23 NOTE — PROGRESS NOTE ADULT - PROBLEM SELECTOR PLAN 5
- IMPROVE Score 3 - on Lovenox SQ for DVT prevention.   - D-dimer was worsening therefore increased Lovenox 40mg SQ to BID  - Constipation: bowel regimen was added s/p suppository w/ BM yesterday.   - Dispo pending course. 1-2 days. Weaned to room air, O2 sats during ambulation.

## 2020-12-23 NOTE — PROGRESS NOTE ADULT - NEGATIVE GENERAL SYMPTOMS
no fever/no chills

## 2020-12-23 NOTE — PROGRESS NOTE ADULT - NEGATIVE GASTROINTESTINAL SYMPTOMS
no vomiting/no diarrhea/no abdominal pain
no abdominal pain/no vomiting/no diarrhea
no vomiting/no diarrhea/no abdominal pain

## 2020-12-23 NOTE — PROGRESS NOTE ADULT - GASTROINTESTINAL DETAILS
soft/nontender/no distention/no guarding/no rigidity
soft/nontender/no distention/no guarding/no rigidity
soft/nontender/no distention/no rebound tenderness/no guarding/no rigidity
soft/nontender/no distention/no guarding/no rigidity
soft/nontender/no distention/no guarding/no rigidity
nontender/no distention/soft/no rebound tenderness/no guarding

## 2020-12-23 NOTE — PROGRESS NOTE ADULT - SUBJECTIVE AND OBJECTIVE BOX
ABIGAIL LI 60y [id number]    Patient is a 60y old  Female who presents with a chief complaint of SOB due to COVID PNA (22 Dec 2020 11:51)      Follow Up/CC:  ID following for COVID    Interval History/ROS: no fever, off O2    Allergies    No Known Allergies    Intolerances        ANTIMICROBIALS:      MEDICATIONS  (STANDING):  dexAMETHasone  Injectable 6 milliGRAM(s) IV Push every 24 hours  enoxaparin Injectable 40 milliGRAM(s) SubCutaneous two times a day  polyethylene glycol 3350 17 Gram(s) Oral daily  senna 2 Tablet(s) Oral at bedtime    MEDICATIONS  (PRN):  acetaminophen   Tablet .. 650 milliGRAM(s) Oral every 6 hours PRN Temp greater or equal to 38C (100.4F), Mild Pain (1 - 3), Moderate Pain (4 - 6)  ALBUTerol    90 MICROgram(s) HFA Inhaler 2 Puff(s) Inhalation every 6 hours PRN Shortness of Breath and/or Wheezing  aluminum hydroxide/magnesium hydroxide/simethicone Suspension 30 milliLiter(s) Oral every 4 hours PRN Dyspepsia  bisacodyl Suppository 10 milliGRAM(s) Rectal daily PRN Constipation  guaifenesin/dextromethorphan  Syrup 10 milliLiter(s) Oral every 4 hours PRN Cough  melatonin 3 milliGRAM(s) Oral at bedtime PRN Insomnia  ondansetron Injectable 4 milliGRAM(s) IV Push every 4 hours PRN Nausea and/or Vomiting        Vital Signs Last 24 Hrs  T(C): 36.4 (23 Dec 2020 09:26), Max: 36.8 (22 Dec 2020 21:13)  T(F): 97.6 (23 Dec 2020 09:26), Max: 98.2 (22 Dec 2020 21:13)  HR: 63 (23 Dec 2020 09:26) (63 - 88)  BP: 111/57 (23 Dec 2020 09:26) (102/58 - 111/57)  BP(mean): --  RR: 18 (23 Dec 2020 09:26) (17 - 18)  SpO2: 99% (23 Dec 2020 09:26) (94% - 100%)    CBC Full  -  ( 23 Dec 2020 08:04 )  WBC Count : 7.45 K/uL  RBC Count : 4.56 M/uL  Hemoglobin : 12.5 g/dL  Hematocrit : 38.6 %  Platelet Count - Automated : 367 K/uL  Mean Cell Volume : 84.6 fL  Mean Cell Hemoglobin : 27.4 pg  Mean Cell Hemoglobin Concentration : 32.4 gm/dL  Auto Neutrophil # : 5.33 K/uL  Auto Lymphocyte # : 1.17 K/uL  Auto Monocyte # : 0.89 K/uL  Auto Eosinophil # : 0.01 K/uL  Auto Basophil # : 0.01 K/uL  Auto Neutrophil % : 71.7 %  Auto Lymphocyte % : 15.7 %  Auto Monocyte % : 11.9 %  Auto Eosinophil % : 0.1 %  Auto Basophil % : 0.1 %    12-23    136  |  101  |  19  ----------------------------<  114<H>  4.8   |  22  |  0.69    Ca    9.1      23 Dec 2020 08:04  Phos  3.2     12-23  Mg     2.9     12-23    TPro  7.3  /  Alb  3.6  /  TBili  0.3  /  DBili  x   /  AST  21  /  ALT  29  /  AlkPhos  64  12-23    LIVER FUNCTIONS - ( 23 Dec 2020 08:04 )  Alb: 3.6 g/dL / Pro: 7.3 g/dL / ALK PHOS: 64 U/L / ALT: 29 U/L / AST: 21 U/L / GGT: x           Procalcitonin, Serum: 0.02 (12-20)  Procalcitonin, Serum: 0.03 (12-17)    C-Reactive Protein, Serum: <4.0 (12-23)  C-Reactive Protein, Serum: 11.0 (12-20)  C-Reactive Protein, Serum: 28.1 (12-17)    Ferritin, Serum: 254 (12-23)  Ferritin, Serum: 269 (12-20)  Ferritin, Serum: 373 (12-17)      D-Dimer Assay, Quantitative: 854 (12-23)  D-Dimer Assay, Quantitative: 1887 (12-20)  D-Dimer Assay, Quantitative: 2448 (12-18)  D-Dimer Assay, Quantitative: 2718 (12-17)        MICROBIOLOGY:  .Blood Blood  12-13-20   No Growth Final  --  --      .Blood Blood-Peripheral  12-11-20   No Growth Final  --  --      .Blood Blood-Peripheral  12-11-20   Growth in aerobic bottle: Staphylococcus hominis  Coag Negative Staphylococcus  Single set isolate, possible contaminant. Contact  Microbiology if susceptibility testing clinically  indicated.  "Due to technical problems, Proteus sp. will Not be reported as part of  the BCID panel until further notice"  ***Blood Panel PCR results on this specimen are available  approximately 3 hours after the Gram stain result.***  Gram stain, PCR, and/or culture results may not always  correspond due to difference in methodologies.  ************************************************************  This PCR assay was performed using [employer].  The following targets are tested for: Enterococcus,  vancomycin resistant enterococci, Listeria monocytogenes,  coagulase negative staphylococci, S. aureus,  methicillin resistant S. aureus, Streptococcus agalactiae  (Group B), S. pneumoniae, S. pyogenes (Group A),  Acinetobacter baumannii, Enterobacter cloacae, E. coli,  Klebsiella oxytoca, K. pneumoniae, Proteus sp.,  Serratia marcescens, Haemophilus influenzae,  Neisseria meningitidis, Pseudomonas aeruginosa, Candida  albicans, C. glabrata, C krusei, C parapsilosis,  C. tropicalis and the KPC resistance gene.  --  Blood Culture PCR      RADIOLOGY    < from: Xray Chest 1 View- PORTABLE-Urgent (Xray Chest 1 View- PORTABLE-Urgent .) (12.11.20 @ 12:08) >  Vague increased markings and wispy hazy groundglass opacities suggested in peripheral left midlung and peripheral right lower lung suspicious for evolving multifocal infectious process including from potential atypical viral etiologies, follow-up suggested otherwise chest CT would be helpful for more definitive evaluation. No pleural effusions or pneumothorax.    Heart size and mediastinal width inaccurately assessed on the projection.    Trachea midline.    Unremarkable osseous structures.    < end of copied text >

## 2020-12-23 NOTE — PROGRESS NOTE ADULT - CARDIOVASCULAR DETAILS
positive S1/positive S2
positive S2/positive S1

## 2020-12-23 NOTE — PROGRESS NOTE ADULT - NEGATIVE CARDIOVASCULAR SYMPTOMS
no chest pain/no palpitations

## 2020-12-23 NOTE — PROGRESS NOTE ADULT - NEGATIVE GENERAL GENITOURINARY SYMPTOMS
no hematuria/no dysuria
no dysuria/no hematuria
no hematuria/no dysuria

## 2020-12-24 LAB
ANION GAP SERPL CALC-SCNC: 11 MMOL/L — SIGNIFICANT CHANGE UP (ref 7–14)
BUN SERPL-MCNC: 20 MG/DL — SIGNIFICANT CHANGE UP (ref 7–23)
CALCIUM SERPL-MCNC: 9 MG/DL — SIGNIFICANT CHANGE UP (ref 8.4–10.5)
CHLORIDE SERPL-SCNC: 101 MMOL/L — SIGNIFICANT CHANGE UP (ref 98–107)
CO2 SERPL-SCNC: 25 MMOL/L — SIGNIFICANT CHANGE UP (ref 22–31)
CREAT SERPL-MCNC: 0.73 MG/DL — SIGNIFICANT CHANGE UP (ref 0.5–1.3)
GLUCOSE SERPL-MCNC: 114 MG/DL — HIGH (ref 70–99)
HCT VFR BLD CALC: 38.6 % — SIGNIFICANT CHANGE UP (ref 34.5–45)
HGB BLD-MCNC: 12.7 G/DL — SIGNIFICANT CHANGE UP (ref 11.5–15.5)
MAGNESIUM SERPL-MCNC: 2.8 MG/DL — HIGH (ref 1.6–2.6)
MCHC RBC-ENTMCNC: 27.9 PG — SIGNIFICANT CHANGE UP (ref 27–34)
MCHC RBC-ENTMCNC: 32.9 GM/DL — SIGNIFICANT CHANGE UP (ref 32–36)
MCV RBC AUTO: 84.6 FL — SIGNIFICANT CHANGE UP (ref 80–100)
NRBC # BLD: 0 /100 WBCS — SIGNIFICANT CHANGE UP
NRBC # FLD: 0 K/UL — SIGNIFICANT CHANGE UP
PHOSPHATE SERPL-MCNC: 3.6 MG/DL — SIGNIFICANT CHANGE UP (ref 2.5–4.5)
PLATELET # BLD AUTO: 386 K/UL — SIGNIFICANT CHANGE UP (ref 150–400)
POTASSIUM SERPL-MCNC: 4.4 MMOL/L — SIGNIFICANT CHANGE UP (ref 3.5–5.3)
POTASSIUM SERPL-SCNC: 4.4 MMOL/L — SIGNIFICANT CHANGE UP (ref 3.5–5.3)
RBC # BLD: 4.56 M/UL — SIGNIFICANT CHANGE UP (ref 3.8–5.2)
RBC # FLD: 13.8 % — SIGNIFICANT CHANGE UP (ref 10.3–14.5)
SODIUM SERPL-SCNC: 137 MMOL/L — SIGNIFICANT CHANGE UP (ref 135–145)
WBC # BLD: 8.46 K/UL — SIGNIFICANT CHANGE UP (ref 3.8–10.5)
WBC # FLD AUTO: 8.46 K/UL — SIGNIFICANT CHANGE UP (ref 3.8–10.5)

## 2020-12-24 PROCEDURE — 99233 SBSQ HOSP IP/OBS HIGH 50: CPT

## 2020-12-24 RX ADMIN — Medication 3 MILLIGRAM(S): at 21:56

## 2020-12-24 RX ADMIN — ENOXAPARIN SODIUM 40 MILLIGRAM(S): 100 INJECTION SUBCUTANEOUS at 05:16

## 2020-12-24 RX ADMIN — ENOXAPARIN SODIUM 40 MILLIGRAM(S): 100 INJECTION SUBCUTANEOUS at 17:59

## 2020-12-24 RX ADMIN — POLYETHYLENE GLYCOL 3350 17 GRAM(S): 17 POWDER, FOR SOLUTION ORAL at 12:01

## 2020-12-24 RX ADMIN — Medication 10 MILLILITER(S): at 18:08

## 2020-12-24 RX ADMIN — ALBUTEROL 2 PUFF(S): 90 AEROSOL, METERED ORAL at 10:45

## 2020-12-24 RX ADMIN — Medication 10 MILLILITER(S): at 11:57

## 2020-12-24 NOTE — PROGRESS NOTE ADULT - PROBLEM SELECTOR PLAN 5
- IMPROVE Score 3 - on Lovenox SQ for DVT prevention.   - D-dimer was worsening therefore increased Lovenox 40mg SQ to BID  - Will need extended DVT ppx on discharge: ASA 81mg daily x30 days.   - Constipation: bowel regimen was added s/p suppository w/ BM yesterday.   - Dispo likely tomorrow,  Weaned to room air, O2 sats during ambulation.

## 2020-12-24 NOTE — PROGRESS NOTE ADULT - SUBJECTIVE AND OBJECTIVE BOX
PROGRESS NOTE:     Patient is a 60y old  Female who presents with a chief complaint of SOB due to COVID PNA (23 Dec 2020 14:47)    SUBJECTIVE / OVERNIGHT EVENTS: Patient seen and evaluated at bedside. Reports feeling "better" but still gets very short of breath during ambulation. Walked with patient in room- noted O2 sat 94 while sitting, and during ambulation dropped to 92% on room air but very symptomatic- appears SOB. Denies any chest pain, abdominal pain, nausea, vomiting. Afebrile overnight.     ADDITIONAL REVIEW OF SYSTEMS:    MEDICATIONS  (STANDING):  enoxaparin Injectable 40 milliGRAM(s) SubCutaneous two times a day  polyethylene glycol 3350 17 Gram(s) Oral daily  senna 2 Tablet(s) Oral at bedtime    MEDICATIONS  (PRN):  acetaminophen   Tablet .. 650 milliGRAM(s) Oral every 6 hours PRN Temp greater or equal to 38C (100.4F), Mild Pain (1 - 3), Moderate Pain (4 - 6)  ALBUTerol    90 MICROgram(s) HFA Inhaler 2 Puff(s) Inhalation every 6 hours PRN Shortness of Breath and/or Wheezing  aluminum hydroxide/magnesium hydroxide/simethicone Suspension 30 milliLiter(s) Oral every 4 hours PRN Dyspepsia  bisacodyl Suppository 10 milliGRAM(s) Rectal daily PRN Constipation  guaifenesin/dextromethorphan  Syrup 10 milliLiter(s) Oral every 4 hours PRN Cough  melatonin 3 milliGRAM(s) Oral at bedtime PRN Insomnia  ondansetron Injectable 4 milliGRAM(s) IV Push every 4 hours PRN Nausea and/or Vomiting    CAPILLARY BLOOD GLUCOSE  I&O's Summary    PHYSICAL EXAM:  Vital Signs Last 24 Hrs  T(C): 36.8 (24 Dec 2020 09:25), Max: 36.9 (23 Dec 2020 20:57)  T(F): 98.2 (24 Dec 2020 09:25), Max: 98.4 (23 Dec 2020 20:57)  HR: 60 (24 Dec 2020 09:25) (60 - 72)  BP: 108/65 (24 Dec 2020 09:25) (108/65 - 126/71)  BP(mean): --  RR: 22 (24 Dec 2020 10:54) (18 - 22)  SpO2: 92% (24 Dec 2020 10:54) (92% - 100%)    PHYSICAL EXAM:  GENERAL: NAD, well developed, middle aged woman, dyspnea noted on exertion.  HEAD:  Atraumatic, Normocephalic  EYES: EOMI, PERRLA, conjunctiva and sclera clear  CHEST/LUNG: Clear to auscultation bilaterally; No wheeze  HEART: Regular rate and rhythm;   ABDOMEN: Soft, Nontender, Nondistended; Bowel sounds present  EXTREMITIES:  2+ Peripheral Pulses, No clubbing, cyanosis, or edema  NEUROLOGY: non-focal, AAOX3  SKIN: No rashes or lesions    LABS: reviewed                         12.7   8.46  )-----------( 386      ( 24 Dec 2020 06:47 )             38.6     12-24    137  |  101  |  20  ----------------------------<  114<H>  4.4   |  25  |  0.73    Ca    9.0      24 Dec 2020 06:47  Phos  3.6     12-24  Mg     2.8     12-24    TPro  7.3  /  Alb  3.6  /  TBili  0.3  /  DBili  x   /  AST  21  /  ALT  29  /  AlkPhos  64  12-23    RADIOLOGY & ADDITIONAL TESTS:  Results Reviewed:   Imaging Personally Reviewed:  Electrocardiogram Personally Reviewed:    COORDINATION OF CARE:  Care Discussed with Consultants/Other Providers [Y/N]:  Prior or Outpatient Records Reviewed [Y/N]:

## 2020-12-24 NOTE — PROGRESS NOTE ADULT - PROBLEM SELECTOR PLAN 2
- COVID PCR + on 12/7 (as OP), 12/11 ([facility]). CXR w hazy opacities. Symptomatic.   - Pt saturating 94 % on room air. During ambulation 92%.   - NOTED to be very symptomatic during ambulation- hold DC until tomorrow.   - RECHECK O2 saturation during ambulation tomorrow.   - Completed Remdesivir day 5/5 and Dexamethasone day 8/10   - Monitor inflammatory markers, LFTs and renal function closely, trending down   - c/w supportive care with cough suppressants and Albuterol MDIs.  - PRN Tylenol for body aches and headache.  - Incentive spirometry, Chest PT as tolerated.

## 2020-12-25 DIAGNOSIS — R07.9 CHEST PAIN, UNSPECIFIED: ICD-10-CM

## 2020-12-25 DIAGNOSIS — I95.1 ORTHOSTATIC HYPOTENSION: ICD-10-CM

## 2020-12-25 LAB
ANION GAP SERPL CALC-SCNC: 11 MMOL/L — SIGNIFICANT CHANGE UP (ref 7–14)
BUN SERPL-MCNC: 24 MG/DL — HIGH (ref 7–23)
CALCIUM SERPL-MCNC: 8.8 MG/DL — SIGNIFICANT CHANGE UP (ref 8.4–10.5)
CHLORIDE SERPL-SCNC: 101 MMOL/L — SIGNIFICANT CHANGE UP (ref 98–107)
CK MB CFR SERPL CALC: 1.2 NG/ML — SIGNIFICANT CHANGE UP
CO2 SERPL-SCNC: 27 MMOL/L — SIGNIFICANT CHANGE UP (ref 22–31)
CREAT SERPL-MCNC: 0.88 MG/DL — SIGNIFICANT CHANGE UP (ref 0.5–1.3)
GLUCOSE SERPL-MCNC: 85 MG/DL — SIGNIFICANT CHANGE UP (ref 70–99)
HCT VFR BLD CALC: 43.1 % — SIGNIFICANT CHANGE UP (ref 34.5–45)
HGB BLD-MCNC: 13.1 G/DL — SIGNIFICANT CHANGE UP (ref 11.5–15.5)
MCHC RBC-ENTMCNC: 28.1 PG — SIGNIFICANT CHANGE UP (ref 27–34)
MCHC RBC-ENTMCNC: 30.4 GM/DL — LOW (ref 32–36)
MCV RBC AUTO: 92.5 FL — SIGNIFICANT CHANGE UP (ref 80–100)
NRBC # BLD: 0 /100 WBCS — SIGNIFICANT CHANGE UP
NRBC # FLD: 0 K/UL — SIGNIFICANT CHANGE UP
PLATELET # BLD AUTO: 354 K/UL — SIGNIFICANT CHANGE UP (ref 150–400)
POTASSIUM SERPL-MCNC: 4.5 MMOL/L — SIGNIFICANT CHANGE UP (ref 3.5–5.3)
POTASSIUM SERPL-SCNC: 4.5 MMOL/L — SIGNIFICANT CHANGE UP (ref 3.5–5.3)
RBC # BLD: 4.66 M/UL — SIGNIFICANT CHANGE UP (ref 3.8–5.2)
RBC # FLD: 14.4 % — SIGNIFICANT CHANGE UP (ref 10.3–14.5)
SODIUM SERPL-SCNC: 139 MMOL/L — SIGNIFICANT CHANGE UP (ref 135–145)
TROPONIN T, HIGH SENSITIVITY RESULT: 8 NG/L — SIGNIFICANT CHANGE UP
WBC # BLD: 7.71 K/UL — SIGNIFICANT CHANGE UP (ref 3.8–10.5)
WBC # FLD AUTO: 7.71 K/UL — SIGNIFICANT CHANGE UP (ref 3.8–10.5)

## 2020-12-25 PROCEDURE — 99233 SBSQ HOSP IP/OBS HIGH 50: CPT

## 2020-12-25 PROCEDURE — 93010 ELECTROCARDIOGRAM REPORT: CPT

## 2020-12-25 RX ORDER — MECLIZINE HCL 12.5 MG
12.5 TABLET ORAL DAILY
Refills: 0 | Status: DISCONTINUED | OUTPATIENT
Start: 2020-12-25 | End: 2020-12-25

## 2020-12-25 RX ORDER — ENOXAPARIN SODIUM 100 MG/ML
40 INJECTION SUBCUTANEOUS EVERY 12 HOURS
Refills: 0 | Status: DISCONTINUED | OUTPATIENT
Start: 2020-12-25 | End: 2020-12-29

## 2020-12-25 RX ORDER — MECLIZINE HCL 12.5 MG
12.5 TABLET ORAL ONCE
Refills: 0 | Status: COMPLETED | OUTPATIENT
Start: 2020-12-25 | End: 2020-12-25

## 2020-12-25 RX ORDER — SODIUM CHLORIDE 9 MG/ML
200 INJECTION INTRAMUSCULAR; INTRAVENOUS; SUBCUTANEOUS ONCE
Refills: 0 | Status: COMPLETED | OUTPATIENT
Start: 2020-12-25 | End: 2020-12-25

## 2020-12-25 RX ADMIN — ENOXAPARIN SODIUM 40 MILLIGRAM(S): 100 INJECTION SUBCUTANEOUS at 17:12

## 2020-12-25 RX ADMIN — Medication 10 MILLILITER(S): at 13:44

## 2020-12-25 RX ADMIN — SODIUM CHLORIDE 400 MILLILITER(S): 9 INJECTION INTRAMUSCULAR; INTRAVENOUS; SUBCUTANEOUS at 13:45

## 2020-12-25 RX ADMIN — Medication 12.5 MILLIGRAM(S): at 13:44

## 2020-12-25 RX ADMIN — ENOXAPARIN SODIUM 40 MILLIGRAM(S): 100 INJECTION SUBCUTANEOUS at 05:22

## 2020-12-25 NOTE — PROGRESS NOTE ADULT - PROBLEM SELECTOR PLAN 3
- COVID PCR + on 12/7 (as OP), 12/11 ([facility]). CXR w hazy opacities. Symptomatic.   - Pt saturating 94-98% on room air. During ambulation 96%.  - RECHECK O2 saturation during ambulation tomorrow.   - Completed Remdesivir day 5/5 and Dexamethasone day 8/10   - Monitor inflammatory markers, LFTs and renal function closely, trending down   - c/w supportive care with cough suppressants and Albuterol MDIs.  - PRN Tylenol for body aches and headache.  - Incentive spirometry, Chest PT as tolerated.

## 2020-12-25 NOTE — PROVIDER CONTACT NOTE (OTHER) - BACKGROUND
admitted for SARS-CoV-2
patient covid19 positive patient on oxygen 2 L via NC
admitted for SARS-CoV-2
60 year old female admitted for covid-19. PMH of HLD.
O2 2L via nasal cannula
Patient COVID +
patient admitted for COVID PNA. has been maintained on room air satting WDL. currently managed with albuterol, tessalon pearls, and Lovenox
pt was admitted for infection due to severe respiratory syndrome

## 2020-12-25 NOTE — PROGRESS NOTE ADULT - PROBLEM SELECTOR PLAN 2
- Symptomatic: reports dizziness, headache this AM.   - Small bolus 250 cc x1, monitor closely.   - Meclizine PRN dizziness.   - DAILY ORTHOSTATICS, CHECK EKG NOW.  - NOTED to be very symptomatic during ambulation- hold DC until tomorrow.

## 2020-12-25 NOTE — PROGRESS NOTE ADULT - PROBLEM SELECTOR PLAN 6
- Plt 121 likely in setting of COVID infection on admission. Spontaneously resolved. - Likely in setting of COVID infection on admission. Spontaneously resolved.

## 2020-12-25 NOTE — PROVIDER CONTACT NOTE (OTHER) - ACTION/TREATMENT ORDERED:
ekg
tele pa notified and will cont. to monitor
tele pa notified and will cont. to monitor
pt with rechecked bp 100/60mmhg, will continue to monitor pt
Adminisiter Acetaminophen, continue to monitor
Continue to monitor. Rectal temp.
Continuous pulse ox
provider notified. pending provider to see patient.

## 2020-12-25 NOTE — PROVIDER CONTACT NOTE (OTHER) - DATE AND TIME:
25-Dec-2020 12:13
25-Dec-2020 22:00
12-Dec-2020 00:16
12-Dec-2020 20:45
13-Dec-2020 20:40
14-Dec-2020 21:15
19-Dec-2020 13:30
25-Dec-2020 10:49

## 2020-12-25 NOTE — PROVIDER CONTACT NOTE (OTHER) - NAME OF MD/NP/PA/DO NOTIFIED:
Amador Lloyd
Amador Lloyd, ACP
Lynn Verma
Michaela Richter, ACP
Sadie Worrell
Reginaldo Zelaya #[id number]
Luke, ACP
Sadie Worrell

## 2020-12-25 NOTE — PROGRESS NOTE ADULT - SUBJECTIVE AND OBJECTIVE BOX
PROGRESS NOTE:     Patient is a 60y old  Female who presents with a chief complaint of SOB due to COVID PNA (24 Dec 2020 14:48)    SUBJECTIVE / OVERNIGHT EVENTS: Patient seen and evaluated at bedside. Saturating well off oxygen at rest and during ambulation but reports feeling dizziness with a headache this AM. Orthostatic positive. Also, reports episode of midsternal chest pain overnight, nonradiating- denies any current CP/SOB, nausea, or vomiting but during conversation patient became very emotional and states she feels very anxious because of the covid illness.     ADDITIONAL REVIEW OF SYSTEMS:    MEDICATIONS  (STANDING):  enoxaparin Injectable 40 milliGRAM(s) SubCutaneous every 12 hours  polyethylene glycol 3350 17 Gram(s) Oral daily  senna 2 Tablet(s) Oral at bedtime    MEDICATIONS  (PRN):  acetaminophen   Tablet .. 650 milliGRAM(s) Oral every 6 hours PRN Temp greater or equal to 38C (100.4F), Mild Pain (1 - 3), Moderate Pain (4 - 6)  ALBUTerol    90 MICROgram(s) HFA Inhaler 2 Puff(s) Inhalation every 6 hours PRN Shortness of Breath and/or Wheezing  aluminum hydroxide/magnesium hydroxide/simethicone Suspension 30 milliLiter(s) Oral every 4 hours PRN Dyspepsia  bisacodyl Suppository 10 milliGRAM(s) Rectal daily PRN Constipation  guaifenesin/dextromethorphan  Syrup 10 milliLiter(s) Oral every 4 hours PRN Cough  melatonin 3 milliGRAM(s) Oral at bedtime PRN Insomnia  ondansetron Injectable 4 milliGRAM(s) IV Push every 4 hours PRN Nausea and/or Vomiting    CAPILLARY BLOOD GLUCOSE    I&O's Summary    PHYSICAL EXAM:  Vital Signs Last 24 Hrs  T(C): 36.6 (25 Dec 2020 10:41), Max: 36.8 (24 Dec 2020 21:53)  T(F): 97.8 (25 Dec 2020 10:41), Max: 98.2 (24 Dec 2020 21:53)  HR: 70 (25 Dec 2020 10:41) (51 - 70)  BP: 104/47 (25 Dec 2020 10:41) (102/58 - 105/56)  BP(mean): --  RR: 22 (25 Dec 2020 10:41) (16 - 22)  SpO2: 96% (25 Dec 2020 10:41) (93% - 98%)    PHYSICAL EXAM:  GENERAL: NAD, well developed, middle aged woman, anxious appearing otherwise comfortable appearing.    HEAD:  Atraumatic, Normocephalic  EYES: EOMI, PERRLA, conjunctiva and sclera clear  CHEST/LUNG: Clear to auscultation bilaterally; No wheeze  HEART: Regular rate and rhythm;   ABDOMEN: Soft, Nontender, Nondistended; Bowel sounds present  EXTREMITIES:  2+ Peripheral Pulses, No clubbing, cyanosis, or edema  NEUROLOGY: non-focal, AAOX3, cooperative.   SKIN: No rashes or lesions    LABS: reviewed.                          13.1   7.71  )-----------( 354      ( 25 Dec 2020 10:13 )             43.1     12-25    139  |  101  |  24<H>  ----------------------------<  85  4.5   |  27  |  0.88    Ca    8.8      25 Dec 2020 10:13  Phos  3.6     12-24  Mg     2.8     12-24    CARDIAC MARKERS ( 25 Dec 2020 10:29 )  x     / x     / x     / x     / 1.2 ng/mL    RADIOLOGY & ADDITIONAL TESTS:  Results Reviewed:   Imaging Personally Reviewed:  Electrocardiogram Personally Reviewed:    COORDINATION OF CARE:  Care Discussed with Consultants/Other Providers [Y/N]:  Prior or Outpatient Records Reviewed [Y/N]:

## 2020-12-25 NOTE — PROVIDER CONTACT NOTE (OTHER) - ASSESSMENT
pt was c/o slight dizziness
pt o2 saturation 96% on RA   pt states they feel alittle dizzy and weak on ambulation   pt noted to RODRIGUEZ   vs as noted and documented
O2 sat 96%, patient super anxious and hyperventilating
Patient desated to 79% on room air while ambulating to bathroom.  Patient A&OX4. Complained of SOB in bathroom. Respirations are 20 breaths/min. Denied SOB when oxygen applied. BP is 103/53-denies dizziness, oral temp is 99.2F.
Patient's temp is 103 F orally, complains of 1 episode of vomiting, was satting 93% on room air, now satting 95% on 2L NC,
patient was received on room air but noted to have difficulty speaking and was very anxious. vitally stable and o2 sat > 90% on RA, pt placed on 2L NC per patient request. patient preformed incentive spirometer exercise and tolerated but she still feels its not helping.
Ortho static BP   laying /59 HR 82  sitting /64 HR 78   standing 80/64 HR 89   pt states he is dizzy on standing
patient called for chest pain pointing in the middle of the chest, vital stable, patient remains alert and oriented x4

## 2020-12-25 NOTE — PROGRESS NOTE ADULT - PROBLEM SELECTOR PLAN 1
- Reports 1 episode of chest pain overnight, midsternal, nonradiating.   - Currently denies any chest pain or shortness of breath, nausea/vomiting.   - Admits to feeling intermittently anxious/overwhelmed due to Covid.   - RULE OUT ACS, symptoms possibly anxiety driven.   - ADD ON CK / TROPONIN TO LABS NOW, TREND IF ELEVATED.   - CHECK EKG STAT, DETERMINE need for Cardiology input based on labs/EKG.

## 2020-12-25 NOTE — PROVIDER CONTACT NOTE (OTHER) - RECOMMENDATIONS
recheck bp ,
Apply 2L oxygen via nasal cannula. Continue to monitor.
Chest PT, continuous pulse ox order
administer PRN acetaminophen as ordered
ekg
provider come to bedside to assess patient

## 2020-12-25 NOTE — PROVIDER CONTACT NOTE (OTHER) - REASON
Dyspnea on exertion
Patient desated to 79% on room air while ambulating to bathroom.
Temp 103F orally
patient is c/o SOB
Orthostatic BP
pt with low bp
O2 on ambulation
chest pain

## 2020-12-25 NOTE — PROGRESS NOTE ADULT - PROBLEM SELECTOR PLAN 7
- IMPROVE Score 3 - on Lovenox SQ for DVT prevention.   - D-dimer was worsening therefore increased Lovenox 40mg SQ to BID  - Will need extended DVT ppx on discharge: ASA 81mg daily x30 days.   - Constipation: bowel regimen was added s/p suppository w/ BM yesterday.   - Dispo likely tomorrow,  Weaned to room air, O2 sats during ambulation. - IMPROVE Score 3 - on Lovenox SQ for DVT prevention.   - D-dimer was worsening therefore increased Lovenox 40mg SQ to BID  - Will need extended DVT ppx on discharge: ASA 81mg daily x30 days.   - Constipation: bowel regimen was added s/p suppository w/ BM yesterday.   - Dispo PENDING COURSE, Weaned to room air, O2 sats stable during ambulation.   - EMOTIONAL SUPPORT PROVIDED TO PT, addressed all questions/concerns.   - Updated  Rodriguez on 12/24 on current condition/treatment plan.

## 2020-12-25 NOTE — PROVIDER CONTACT NOTE (OTHER) - SITUATION
patient complaint of chest pain but no sweating, no shortness of breathe
O2 saturation 96% on RA
Patient desated to 79% on room air while ambulating to bathroom.
Patient is severely dyspneic when ambulating to bathroom while using O2 tank. Chest PT provided, inhaler given, back rubs, patient in tripod position
Patient's temp is 103 F orally, complains of 1 episode of vomiting, was satting 93% on room air, now satting 95% on 2L NC
patient is c/o SOB and states that current regimen is not helping
Ortho static BP   laying /59 HR 82  sitting /64 HR 78   standing 80/64 HR 89
pt with  low bp 92/58mmhg

## 2020-12-26 LAB
ANION GAP SERPL CALC-SCNC: 10 MMOL/L — SIGNIFICANT CHANGE UP (ref 7–14)
BUN SERPL-MCNC: 19 MG/DL — SIGNIFICANT CHANGE UP (ref 7–23)
CALCIUM SERPL-MCNC: 8.9 MG/DL — SIGNIFICANT CHANGE UP (ref 8.4–10.5)
CHLORIDE SERPL-SCNC: 103 MMOL/L — SIGNIFICANT CHANGE UP (ref 98–107)
CO2 SERPL-SCNC: 24 MMOL/L — SIGNIFICANT CHANGE UP (ref 22–31)
CREAT SERPL-MCNC: 0.79 MG/DL — SIGNIFICANT CHANGE UP (ref 0.5–1.3)
GLUCOSE SERPL-MCNC: 96 MG/DL — SIGNIFICANT CHANGE UP (ref 70–99)
HCT VFR BLD CALC: 37 % — SIGNIFICANT CHANGE UP (ref 34.5–45)
HGB BLD-MCNC: 12 G/DL — SIGNIFICANT CHANGE UP (ref 11.5–15.5)
MAGNESIUM SERPL-MCNC: 2.6 MG/DL — SIGNIFICANT CHANGE UP (ref 1.6–2.6)
MCHC RBC-ENTMCNC: 28.2 PG — SIGNIFICANT CHANGE UP (ref 27–34)
MCHC RBC-ENTMCNC: 32.4 GM/DL — SIGNIFICANT CHANGE UP (ref 32–36)
MCV RBC AUTO: 86.9 FL — SIGNIFICANT CHANGE UP (ref 80–100)
NRBC # BLD: 0 /100 WBCS — SIGNIFICANT CHANGE UP
NRBC # FLD: 0 K/UL — SIGNIFICANT CHANGE UP
PHOSPHATE SERPL-MCNC: 3.1 MG/DL — SIGNIFICANT CHANGE UP (ref 2.5–4.5)
PLATELET # BLD AUTO: 300 K/UL — SIGNIFICANT CHANGE UP (ref 150–400)
POTASSIUM SERPL-MCNC: 4.3 MMOL/L — SIGNIFICANT CHANGE UP (ref 3.5–5.3)
POTASSIUM SERPL-SCNC: 4.3 MMOL/L — SIGNIFICANT CHANGE UP (ref 3.5–5.3)
RBC # BLD: 4.26 M/UL — SIGNIFICANT CHANGE UP (ref 3.8–5.2)
RBC # FLD: 14.2 % — SIGNIFICANT CHANGE UP (ref 10.3–14.5)
SODIUM SERPL-SCNC: 137 MMOL/L — SIGNIFICANT CHANGE UP (ref 135–145)
WBC # BLD: 6.66 K/UL — SIGNIFICANT CHANGE UP (ref 3.8–10.5)
WBC # FLD AUTO: 6.66 K/UL — SIGNIFICANT CHANGE UP (ref 3.8–10.5)

## 2020-12-26 PROCEDURE — 99233 SBSQ HOSP IP/OBS HIGH 50: CPT

## 2020-12-26 RX ORDER — MIDODRINE HYDROCHLORIDE 2.5 MG/1
2.5 TABLET ORAL THREE TIMES A DAY
Refills: 0 | Status: DISCONTINUED | OUTPATIENT
Start: 2020-12-26 | End: 2020-12-27

## 2020-12-26 RX ORDER — MECLIZINE HCL 12.5 MG
25 TABLET ORAL THREE TIMES A DAY
Refills: 0 | Status: DISCONTINUED | OUTPATIENT
Start: 2020-12-26 | End: 2020-12-29

## 2020-12-26 RX ORDER — SODIUM CHLORIDE 9 MG/ML
250 INJECTION INTRAMUSCULAR; INTRAVENOUS; SUBCUTANEOUS ONCE
Refills: 0 | Status: COMPLETED | OUTPATIENT
Start: 2020-12-26 | End: 2020-12-26

## 2020-12-26 RX ORDER — SODIUM CHLORIDE 9 MG/ML
250 INJECTION INTRAMUSCULAR; INTRAVENOUS; SUBCUTANEOUS ONCE
Refills: 0 | Status: DISCONTINUED | OUTPATIENT
Start: 2020-12-26 | End: 2020-12-26

## 2020-12-26 RX ORDER — MECLIZINE HCL 12.5 MG
12.5 TABLET ORAL DAILY
Refills: 0 | Status: DISCONTINUED | OUTPATIENT
Start: 2020-12-26 | End: 2020-12-26

## 2020-12-26 RX ADMIN — ENOXAPARIN SODIUM 40 MILLIGRAM(S): 100 INJECTION SUBCUTANEOUS at 17:37

## 2020-12-26 RX ADMIN — SODIUM CHLORIDE 125 MILLILITER(S): 9 INJECTION INTRAMUSCULAR; INTRAVENOUS; SUBCUTANEOUS at 14:23

## 2020-12-26 RX ADMIN — POLYETHYLENE GLYCOL 3350 17 GRAM(S): 17 POWDER, FOR SOLUTION ORAL at 11:52

## 2020-12-26 RX ADMIN — SENNA PLUS 2 TABLET(S): 8.6 TABLET ORAL at 21:11

## 2020-12-26 RX ADMIN — MIDODRINE HYDROCHLORIDE 2.5 MILLIGRAM(S): 2.5 TABLET ORAL at 21:11

## 2020-12-26 RX ADMIN — ENOXAPARIN SODIUM 40 MILLIGRAM(S): 100 INJECTION SUBCUTANEOUS at 05:18

## 2020-12-26 RX ADMIN — SODIUM CHLORIDE 125 MILLILITER(S): 9 INJECTION INTRAMUSCULAR; INTRAVENOUS; SUBCUTANEOUS at 14:45

## 2020-12-26 NOTE — PROGRESS NOTE ADULT - PROBLEM SELECTOR PLAN 2
- 12/25: Reports 1 episode of chest pain overnight, midsternal, nonradiating.   - Currently denies any chest pain or shortness of breath, nausea/vomiting.   - Admits to feeling intermittently anxious/overwhelmed due to Covid.   - RULE OUT ACS, symptoms possibly anxiety driven.   - 12/25: TROPONIN negative, EKG with no acute findings, no ST changes.   - Monitor for now.

## 2020-12-26 NOTE — PROGRESS NOTE ADULT - PROBLEM SELECTOR PLAN 1
- Symptomatic: reports dizziness, headache this AM.  - Possible dysautonomia in the setting of COVID infection?  - s/p bolus 250 cc x2, judicious use of fluids.   - Will start Midodrine 2.5mg TID. HOLD for SBP >120.   - Meclizine PRN dizziness.   - Check daily orthostatics. - Symptomatic: reports dizziness, headache this AM.  - Possible dysautonomia in the setting of COVID infection?  - s/p bolus 250 cc x2, judicious use of fluids.   - Will start Midodrine 2.5mg TID. HOLD for SBP >120.   - Meclizine PRN dizziness.   - Check daily orthostatics. f/u TTE.

## 2020-12-26 NOTE — PROGRESS NOTE ADULT - SUBJECTIVE AND OBJECTIVE BOX
PROGRESS NOTE:     Patient is a 60y old  Female who presents with a chief complaint of SOB due to COVID PNA (25 Dec 2020 14:04)    SUBJECTIVE / OVERNIGHT EVENTS: Patient continues to report dizziness, otherwise no other complaints overnight. Denies any sob, cp, abdominal pain. Orthostatic + again. No overnight events.     ADDITIONAL REVIEW OF SYSTEMS:    MEDICATIONS  (STANDING):  enoxaparin Injectable 40 milliGRAM(s) SubCutaneous every 12 hours  polyethylene glycol 3350 17 Gram(s) Oral daily  senna 2 Tablet(s) Oral at bedtime  sodium chloride 0.9% Bolus 250 milliLiter(s) IV Bolus once    MEDICATIONS  (PRN):  acetaminophen   Tablet .. 650 milliGRAM(s) Oral every 6 hours PRN Temp greater or equal to 38C (100.4F), Mild Pain (1 - 3), Moderate Pain (4 - 6)  ALBUTerol    90 MICROgram(s) HFA Inhaler 2 Puff(s) Inhalation every 6 hours PRN Shortness of Breath and/or Wheezing  aluminum hydroxide/magnesium hydroxide/simethicone Suspension 30 milliLiter(s) Oral every 4 hours PRN Dyspepsia  bisacodyl Suppository 10 milliGRAM(s) Rectal daily PRN Constipation  guaifenesin/dextromethorphan  Syrup 10 milliLiter(s) Oral every 4 hours PRN Cough  meclizine 25 milliGRAM(s) Oral three times a day PRN Dizziness  melatonin 3 milliGRAM(s) Oral at bedtime PRN Insomnia  ondansetron Injectable 4 milliGRAM(s) IV Push every 4 hours PRN Nausea and/or Vomiting    CAPILLARY BLOOD GLUCOSE    I&O's Summary    PHYSICAL EXAM:  Vital Signs Last 24 Hrs  T(C): 36.5 (26 Dec 2020 11:30), Max: 36.8 (26 Dec 2020 05:08)  T(F): 97.7 (26 Dec 2020 11:30), Max: 98.3 (26 Dec 2020 05:08)  HR: 85 (26 Dec 2020 14:47) (62 - 89)  BP: 99/49 (26 Dec 2020 14:47) (88/41 - 104/61)  BP(mean): --  RR: 18 (26 Dec 2020 11:30) (18 - 18)  SpO2: 96% (26 Dec 2020 11:30) (95% - 100%)    CONSTITUTIONAL: NAD, well-developed, middle aged female.   RESPIRATORY: Normal respiratory effort; lungs are clear to auscultation bilaterally  CARDIOVASCULAR: Regular rate and rhythm, normal S1 and S2, no murmur/rub/gallop;  No lower extremity edema; Peripheral pulses are 2+ bilaterally  ABDOMEN: Nontender to palpation, normoactive bowel sounds,   MUSCLOSKELETAL: no clubbing or cyanosis of digits; no joint swelling or tenderness to palpation  PSYCH: A+O to person, place, and time; affect appropriate    LABS: reviewed.                         12.0   6.66  )-----------( 300      ( 26 Dec 2020 09:38 )             37.0     12-26    137  |  103  |  19  ----------------------------<  96  4.3   |  24  |  0.79    Ca    8.9      26 Dec 2020 09:38  Phos  3.1     12-26  Mg     2.6     12-26      CARDIAC MARKERS ( 25 Dec 2020 10:29 )  x     / x     / x     / x     / 1.2 ng/mL    RADIOLOGY & ADDITIONAL TESTS:  Results Reviewed:   Imaging Personally Reviewed:  Electrocardiogram Personally Reviewed:    COORDINATION OF CARE:  Care Discussed with Consultants/Other Providers [Y/N]:  Prior or Outpatient Records Reviewed [Y/N]:

## 2020-12-26 NOTE — PROGRESS NOTE ADULT - PROBLEM SELECTOR PLAN 7
- IMPROVE Score 3 - on Lovenox SQ for DVT prevention.   - D-dimer was worsening therefore increased Lovenox 40mg SQ to BID  - Will need extended DVT ppx on DC ASA 81mg or Xarelto 10mg ( x30 days).   - Constipation: bowel regimen was added s/p suppository w/ BM yesterday.   - Dispo PENDING COURSE, Weaned to room air, O2 sats stable during ambulation.   - Updated  Rodriguez on 12/24 on current condition/treatment plan.

## 2020-12-27 LAB
ANION GAP SERPL CALC-SCNC: 11 MMOL/L — SIGNIFICANT CHANGE UP (ref 7–14)
BUN SERPL-MCNC: 18 MG/DL — SIGNIFICANT CHANGE UP (ref 7–23)
CALCIUM SERPL-MCNC: 9.1 MG/DL — SIGNIFICANT CHANGE UP (ref 8.4–10.5)
CHLORIDE SERPL-SCNC: 103 MMOL/L — SIGNIFICANT CHANGE UP (ref 98–107)
CO2 SERPL-SCNC: 25 MMOL/L — SIGNIFICANT CHANGE UP (ref 22–31)
CREAT SERPL-MCNC: 0.8 MG/DL — SIGNIFICANT CHANGE UP (ref 0.5–1.3)
CRP SERPL-MCNC: 10.7 MG/L — HIGH
D DIMER BLD IA.RAPID-MCNC: 364 NG/ML DDU — HIGH
FERRITIN SERPL-MCNC: 248 NG/ML — HIGH (ref 15–150)
GLUCOSE SERPL-MCNC: 110 MG/DL — HIGH (ref 70–99)
HCT VFR BLD CALC: 41.5 % — SIGNIFICANT CHANGE UP (ref 34.5–45)
HGB BLD-MCNC: 13.2 G/DL — SIGNIFICANT CHANGE UP (ref 11.5–15.5)
MAGNESIUM SERPL-MCNC: 2.7 MG/DL — HIGH (ref 1.6–2.6)
MCHC RBC-ENTMCNC: 27.7 PG — SIGNIFICANT CHANGE UP (ref 27–34)
MCHC RBC-ENTMCNC: 31.8 GM/DL — LOW (ref 32–36)
MCV RBC AUTO: 87 FL — SIGNIFICANT CHANGE UP (ref 80–100)
NRBC # BLD: 0 /100 WBCS — SIGNIFICANT CHANGE UP
NRBC # FLD: 0 K/UL — SIGNIFICANT CHANGE UP
PHOSPHATE SERPL-MCNC: 3.1 MG/DL — SIGNIFICANT CHANGE UP (ref 2.5–4.5)
PLATELET # BLD AUTO: 284 K/UL — SIGNIFICANT CHANGE UP (ref 150–400)
POTASSIUM SERPL-MCNC: 4.5 MMOL/L — SIGNIFICANT CHANGE UP (ref 3.5–5.3)
POTASSIUM SERPL-SCNC: 4.5 MMOL/L — SIGNIFICANT CHANGE UP (ref 3.5–5.3)
RBC # BLD: 4.77 M/UL — SIGNIFICANT CHANGE UP (ref 3.8–5.2)
RBC # FLD: 14.2 % — SIGNIFICANT CHANGE UP (ref 10.3–14.5)
SODIUM SERPL-SCNC: 139 MMOL/L — SIGNIFICANT CHANGE UP (ref 135–145)
WBC # BLD: 5.82 K/UL — SIGNIFICANT CHANGE UP (ref 3.8–10.5)
WBC # FLD AUTO: 5.82 K/UL — SIGNIFICANT CHANGE UP (ref 3.8–10.5)

## 2020-12-27 PROCEDURE — 99221 1ST HOSP IP/OBS SF/LOW 40: CPT | Mod: GC

## 2020-12-27 PROCEDURE — 99233 SBSQ HOSP IP/OBS HIGH 50: CPT

## 2020-12-27 RX ORDER — MIDODRINE HYDROCHLORIDE 2.5 MG/1
5 TABLET ORAL THREE TIMES A DAY
Refills: 0 | Status: DISCONTINUED | OUTPATIENT
Start: 2020-12-27 | End: 2020-12-29

## 2020-12-27 RX ADMIN — Medication 30 MILLILITER(S): at 10:00

## 2020-12-27 RX ADMIN — ENOXAPARIN SODIUM 40 MILLIGRAM(S): 100 INJECTION SUBCUTANEOUS at 17:33

## 2020-12-27 RX ADMIN — MIDODRINE HYDROCHLORIDE 5 MILLIGRAM(S): 2.5 TABLET ORAL at 17:33

## 2020-12-27 RX ADMIN — MIDODRINE HYDROCHLORIDE 5 MILLIGRAM(S): 2.5 TABLET ORAL at 11:37

## 2020-12-27 RX ADMIN — ENOXAPARIN SODIUM 40 MILLIGRAM(S): 100 INJECTION SUBCUTANEOUS at 05:36

## 2020-12-27 RX ADMIN — MIDODRINE HYDROCHLORIDE 2.5 MILLIGRAM(S): 2.5 TABLET ORAL at 05:36

## 2020-12-27 NOTE — PROGRESS NOTE ADULT - PROBLEM SELECTOR PLAN 7
- IMPROVE Score 3 - on Lovenox SQ for DVT prevention.   - D-dimer was worsening therefore increased Lovenox 40mg SQ to BID  - Will need extended DVT ppx on DC ASA 81mg or Xarelto 10mg ( x30 days).   - Constipation: bowel regimen was added s/p suppository PRN.   - Dispo PENDING COURSE, Weaned to room air, O2 sats stable during ambulation.   - Updated  Rodriguez on 12/24 on current condition/treatment plan.

## 2020-12-27 NOTE — PROGRESS NOTE ADULT - PROBLEM SELECTOR PLAN 1
- Symptomatic: reports continued intermittent dizziness, headache. No SOB/CP.   - Possible dysautonomia in the setting of COVID infection?  - s/p bolus 250 cc x2, judicious use of fluids.   - INCREASED Midodrine 5mg TID. HOLD for SBP >120.   - Meclizine PRN dizziness.   - Check daily orthostatics. f/u TTE.  - CARDIOLOGY CONSULT- follow up recommendations.

## 2020-12-27 NOTE — PROGRESS NOTE ADULT - SUBJECTIVE AND OBJECTIVE BOX
PROGRESS NOTE:     Patient is a 60y old  Female who presents with a chief complaint of SOB due to COVID PNA (26 Dec 2020 19:54)    SUBJECTIVE / OVERNIGHT EVENTS: Patient seen and evaluated at bedside. No overnight events. BP trend remains soft- SBP 90-100s. Afebrile, no tachy, saturating well on room air. Denies any current chest pain, shortness of breath, abdominal pain, but still has intermittent dizziness, associated with headache.     ADDITIONAL REVIEW OF SYSTEMS:    MEDICATIONS  (STANDING):  enoxaparin Injectable 40 milliGRAM(s) SubCutaneous every 12 hours  midodrine. 5 milliGRAM(s) Oral three times a day  polyethylene glycol 3350 17 Gram(s) Oral daily  senna 2 Tablet(s) Oral at bedtime    MEDICATIONS  (PRN):  acetaminophen   Tablet .. 650 milliGRAM(s) Oral every 6 hours PRN Temp greater or equal to 38C (100.4F), Mild Pain (1 - 3), Moderate Pain (4 - 6)  ALBUTerol    90 MICROgram(s) HFA Inhaler 2 Puff(s) Inhalation every 6 hours PRN Shortness of Breath and/or Wheezing  aluminum hydroxide/magnesium hydroxide/simethicone Suspension 30 milliLiter(s) Oral every 4 hours PRN Dyspepsia  bisacodyl Suppository 10 milliGRAM(s) Rectal daily PRN Constipation  guaifenesin/dextromethorphan  Syrup 10 milliLiter(s) Oral every 4 hours PRN Cough  meclizine 25 milliGRAM(s) Oral three times a day PRN Dizziness  melatonin 3 milliGRAM(s) Oral at bedtime PRN Insomnia  ondansetron Injectable 4 milliGRAM(s) IV Push every 4 hours PRN Nausea and/or Vomiting    CAPILLARY BLOOD GLUCOSE    I&O's Summary    PHYSICAL EXAM:  Vital Signs Last 24 Hrs  T(C): 37 (27 Dec 2020 11:14), Max: 37 (27 Dec 2020 11:14)  T(F): 98.6 (27 Dec 2020 11:14), Max: 98.6 (27 Dec 2020 11:14)  HR: 61 (27 Dec 2020 11:14) (61 - 94)  BP: 99/50 (27 Dec 2020 11:14) (97/51 - 109/62)  BP(mean): --  RR: 17 (27 Dec 2020 11:14) (17 - 20)  SpO2: 97% (27 Dec 2020 11:14) (97% - 97%)    PHYSICAL EXAM:  GENERAL: NAD, well developed, middle aged woman, anxious appearing otherwise comfortable appearing.    HEAD:  Atraumatic, Normocephalic  EYES: EOMI, PERRLA, conjunctiva and sclera clear  CHEST/LUNG: Clear to auscultation bilaterally; No wheeze  HEART: Regular rate and rhythm;   ABDOMEN: Soft, Nontender, Nondistended; Bowel sounds present  EXTREMITIES:  2+ Peripheral Pulses, No clubbing, cyanosis, or edema  NEUROLOGY: non-focal, AAOX3, cooperative.   SKIN: No rashes or lesions    LABS: reviewed                         13.2   5.82  )-----------( 284      ( 27 Dec 2020 07:33 )             41.5     12-27    139  |  103  |  18  ----------------------------<  110<H>  4.5   |  25  |  0.80    Ca    9.1      27 Dec 2020 07:33  Phos  3.1     12-27  Mg     2.7     12-27    RADIOLOGY & ADDITIONAL TESTS:  Results Reviewed:   Imaging Personally Reviewed:  Electrocardiogram Personally Reviewed:    COORDINATION OF CARE:  Care Discussed with Consultants/Other Providers [Y/N]:  Prior or Outpatient Records Reviewed [Y/N]:

## 2020-12-27 NOTE — CONSULT NOTE ADULT - ATTENDING COMMENTS
Lynnette Orourke is a 60F with HLD presents with SOB and cough, found to be COVID + and treated for pneumonia with antibiotics, dexamethasone, and Remdesivir. There were generalized body aches. Hospital course was complicated by orthostatic dizziness. Overall /50 m Hg on 12/27/2020 at 14:27, with reported range 97/51 - 109/62 mm Hg. T(C): 36.8 on 12/27/2020 at 14:27, with T Max: 37 at 11:14 Standing regimen: enoxaparin 40 mg SC every 12 hours, midodrine 5 mg oral 3X daily, albuterol 90 mCg HFA Inhaler 2 Puff(s) Inhalation every 6 hours PRN, guaifenesin/dextromethorphan syrup 10 mL oral every 4 hours PRN, acetaminophen 650 mg oral every 6 hours PRN, meclizine 25 mg oral 3X daily PRN, melatonin 3 mg oral at bedtime PRN, ondansetron 4 mg IV Push every 4 hours PRN, aluminum hydroxide /magnesium hydroxide /simethicone Suspension 30 mL oral every 4 hours PRN, bisacodyl suppository 10 mg rectal daily PRN, polyethylene glycol 3350 17 Grams oral daily and senna 2 tabs oral at bedtime. This patient presents with orthostatic hypotension. The most likely etiology in this clinical presentation would be hypovolemia.  Consider IV hydration with normal saline for 24 hours followed by reassessment of seated and standing BP. There is no contraindication to midodrine
Shade Howell  Attending Physician   Division of Infectious Disease  Pager #[phone]  After 5pm/weekend or no response, call #[phone]

## 2020-12-27 NOTE — CONSULT NOTE ADULT - SUBJECTIVE AND OBJECTIVE BOX
GUILLERMOABIGAIL REY 60y Female  MRN-[id number]    Patient is a 60y old  Female who presents with a chief complaint of SOB due to COVID PNA (13 Dec 2020 14:35)      HPI:  59 yo obese F with HLD presents with new shortness of breath in setting of recent COVID diagnosis as OP. Patient states that hse has been going to [employer], last 12/1/20, and many of the congregates do not wear masks the entire service, states started feeling ill 12/3/20.  Started with cough/chills/general maliase and weakness.  Had OP COVID testing on 12/7,  whom she lives with also positive, reports she believes she infected him; no recent travel, does not work out side the home,  Starting on 12/10 started having hacking cough that was causing worsening sob; reports urgent care prescribed 4 day of an abx but she doesn't recalll which.  Has also diarrhea usually after eating, mostly taking liquids, also some nausea but no vomiting; no CP.  No prior lung disease, never smoker.      In ED AVSS with RA sat wnl however with tachypnea and labs normal for mild transaminitis (84/53) and thrombocytopenia (121), d-dimer 503, pH 7.46/38 LA 2.6.   CXR with hazy in vague increased markings and wispy hazy ground-glass opacities suggested in peripheral left midlung and peripheral right lower lung suspicious for evolving multifocal infectious   (11 Dec 2020 14:11)      PAST MEDICAL & SURGICAL HISTORY:  Obesity    Hyperlipidemia    No significant past surgical history        Allergies    No Known Allergies    Intolerances        ANTIMICROBIALS:  remdesivir  IVPB        MEDICATIONS  (STANDING):  benzonatate 100 milliGRAM(s) Oral every 8 hours  dexAMETHasone  IVPB 6 milliGRAM(s) IV Intermittent daily  enoxaparin Injectable 40 milliGRAM(s) SubCutaneous at bedtime  remdesivir  IVPB   IV Intermittent       Social History  Smoking: no  Etoh: no  Drug use: no      FAMILY HISTORY:  FH: prostate cancer  Father    FH: gastric cancer  Mother        Vital Signs Last 24 Hrs  T(C): 36.6 (14 Dec 2020 08:54), Max: 39.4 (13 Dec 2020 20:34)  T(F): 97.8 (14 Dec 2020 08:54), Max: 103 (13 Dec 2020 20:34)  HR: 67 (14 Dec 2020 08:54) (67 - 93)  BP: 97/53 (14 Dec 2020 08:54) (97/53 - 127/62)  BP(mean): --  RR: 20 (14 Dec 2020 08:54) (18 - 20)  SpO2: 93% (14 Dec 2020 08:54) (93% - 95%)    CBC Full  -  ( 14 Dec 2020 07:20 )  WBC Count : 5.32 K/uL  RBC Count : 3.89 M/uL  Hemoglobin : 10.9 g/dL  Hematocrit : 34.1 %  Platelet Count - Automated : 158 K/uL  Mean Cell Volume : 87.7 fL  Mean Cell Hemoglobin : 28.0 pg  Mean Cell Hemoglobin Concentration : 32.0 gm/dL  Auto Neutrophil # : x  Auto Lymphocyte # : x  Auto Monocyte # : x  Auto Eosinophil # : x  Auto Basophil # : x  Auto Neutrophil % : x  Auto Lymphocyte % : x  Auto Monocyte % : x  Auto Eosinophil % : x  Auto Basophil % : x    12-14    137  |  102  |  12  ----------------------------<  101<H>  3.7   |  22  |  0.86    Ca    8.1<L>      14 Dec 2020 07:20            MICROBIOLOGY:  .Blood Blood  12-13-20   No growth to date.  --  --      .Blood Blood-Peripheral  12-11-20   No growth to date.  --  --      .Blood Blood-Peripheral  12-11-20   Growth in aerobic bottle: Staphylococcus hominis  Coag Negative Staphylococcus  Single set isolate, possible contaminant. Contact  Microbiology if susceptibility testing clinically  indicated.  "Due to technical problems, Proteus sp. will Not be reported as part of  the BCID panel until further notice"  ***Blood Panel PCR results on this specimen are available  approximately 3 hours after the Gram stain result.***  Gram stain, PCR, and/or culture results may not always  correspond due to difference in methodologies.  ************************************************************  This PCR assay was performed using [facility].  The following targets are tested for: Enterococcus,  vancomycin resistant enterococci, Listeria monocytogenes,  coagulase negative staphylococci, S. aureus,  methicillin resistant S. aureus, Streptococcus agalactiae  (Group B), S. pneumoniae, S. pyogenes (Group A),  Acinetobacter baumannii, Enterobacter cloacae, E. coli,  Klebsiella oxytoca, K. pneumoniae, Proteus sp.,  Serratia marcescens, Haemophilus influenzae,  Neisseria meningitidis, Pseudomonas aeruginosa, Candida  albicans, C. glabrata, C krusei, C parapsilosis,  C. tropicalis and the KPC resistance gene.  --  Blood Culture PCR        Rapid RVP Result: Detected (12-11 @ 11:39)      RADIOLOGY  < from: Xray Chest 1 View- PORTABLE-Urgent (Xray Chest 1 View- PORTABLE-Urgent .) (12.11.20 @ 12:08) >  Vague increased markings and wispy hazy groundglass opacities suggested in peripheral left midlung and peripheral right lower lung suspicious for evolving multifocal infectious process including from potential atypical viral etiologies, follow-up suggested otherwise chest CT would be helpful for more definitive evaluation. No pleural effusions or pneumothorax.    Heart size and mediastinal width inaccurately assessed on the projection.    Trachea midline.    Unremarkable osseous structures.    < end of copied text >  
Date of Admission: 12/11/2020    CHIEF COMPLAINT: SOB and cough    HISTORY OF PRESENT ILLNESS: 60F with HLD presents with SOB and cough, found to be COVID + and treated for PNA with antibiotics, dexamethasone, and Remdesivir. Patient continues with generalized body aches. Course c/b orthostatic VS changes associated with dizziness when changing position. Patient that sitting is not a problem but standing remains difficult. Meclizine prn tried without effect and today started on midodrine but has only received one dose. Cardiology is consulted for BP management.      Allergies    No Known Allergies    Intolerances    	    MEDICATIONS:  enoxaparin Injectable 40 milliGRAM(s) SubCutaneous every 12 hours  midodrine. 5 milliGRAM(s) Oral three times a day      ALBUTerol    90 MICROgram(s) HFA Inhaler 2 Puff(s) Inhalation every 6 hours PRN  guaifenesin/dextromethorphan  Syrup 10 milliLiter(s) Oral every 4 hours PRN    acetaminophen   Tablet .. 650 milliGRAM(s) Oral every 6 hours PRN  meclizine 25 milliGRAM(s) Oral three times a day PRN  melatonin 3 milliGRAM(s) Oral at bedtime PRN  ondansetron Injectable 4 milliGRAM(s) IV Push every 4 hours PRN    aluminum hydroxide/magnesium hydroxide/simethicone Suspension 30 milliLiter(s) Oral every 4 hours PRN  bisacodyl Suppository 10 milliGRAM(s) Rectal daily PRN  polyethylene glycol 3350 17 Gram(s) Oral daily  senna 2 Tablet(s) Oral at bedtime          PAST MEDICAL & SURGICAL HISTORY:  Obesity    Hyperlipidemia    No significant past surgical history    FAMILY HISTORY:  FH: prostate cancer  Father    FH: gastric cancer  Mother    SOCIAL HISTORY:    Smoker  denies  Alcohol denies  Drugs denies      REVIEW OF SYSTEMS:  See HPI. Otherwise, 10 point ROS done and otherwise negative.    PHYSICAL EXAM:  T(C): 36.8 (12-27-20 @ 14:27), Max: 37 (12-27-20 @ 11:14)  HR: 85 (12-27-20 @ 14:27) (61 - 94)  BP: 105/50 (12-27-20 @ 14:27) (97/51 - 109/62)  RR: 17 (12-27-20 @ 14:27) (17 - 20)  SpO2: 97% (12-27-20 @ 14:27) (97% - 97%)  Wt(kg): --  I&O's Summary    Limited PE:  Appearance: Normal	  HEENT:   Normal oral mucosa, PERRL, EOMI	  Cardiovascular: nl S1S2  Respiratory: Lungs clear to auscultation	  Psychiatry: A & O x 3, Mood & affect appropriate  Gastrointestinal:  Soft, Non-tender, + BS	  Skin: No rashes, No ecchymoses, No cyanosis	  Neurologic: Non-focal  Extremities: nl C/C/E    LABS:	 	                        13.2   5.82  )-----------( 284      ( 27 Dec 2020 07:33 )             41.5       12-27    139  |  103  |  18  ----------------------------<  110<H>  4.5   |  25  |  0.80    12-26    137  |  103  |  19  ----------------------------<  96  4.3   |  24  |  0.79    Ca    9.1      27 Dec 2020 07:33  Ca    8.9      26 Dec 2020 09:38  Phos  3.1     12-27  Phos  3.1     12-26  Mg     2.7     12-27  Mg     2.6     12-26    	    ECG:  	NSR

## 2020-12-27 NOTE — CONSULT NOTE ADULT - ASSESSMENT
60F with HLD presents with SOB and cough, found to be COVID + and treated for PNA with antibiotics, dexamethasone, and Remdesivir now with orthostatic VS changes.     D/W Dr. Dale, cardio attending would continue midodrine, liberalize fluids and given IV if needed. Cardiology will follow.       
61 yo obese F with HLD presents with new shortness of breath in setting of recent COVID diagnosis as OP with fever, cough, COVID PNA, hypoxia, transaminitis

## 2020-12-28 LAB
ANION GAP SERPL CALC-SCNC: 10 MMOL/L — SIGNIFICANT CHANGE UP (ref 7–14)
BUN SERPL-MCNC: 17 MG/DL — SIGNIFICANT CHANGE UP (ref 7–23)
CALCIUM SERPL-MCNC: 8.9 MG/DL — SIGNIFICANT CHANGE UP (ref 8.4–10.5)
CHLORIDE SERPL-SCNC: 102 MMOL/L — SIGNIFICANT CHANGE UP (ref 98–107)
CO2 SERPL-SCNC: 25 MMOL/L — SIGNIFICANT CHANGE UP (ref 22–31)
CREAT SERPL-MCNC: 0.92 MG/DL — SIGNIFICANT CHANGE UP (ref 0.5–1.3)
CRP SERPL-MCNC: 7.5 MG/L — HIGH
D DIMER BLD IA.RAPID-MCNC: 289 NG/ML DDU — HIGH
FERRITIN SERPL-MCNC: 210 NG/ML — HIGH (ref 15–150)
GLUCOSE SERPL-MCNC: 101 MG/DL — HIGH (ref 70–99)
HCT VFR BLD CALC: 37.5 % — SIGNIFICANT CHANGE UP (ref 34.5–45)
HGB BLD-MCNC: 12 G/DL — SIGNIFICANT CHANGE UP (ref 11.5–15.5)
MAGNESIUM SERPL-MCNC: 2.6 MG/DL — SIGNIFICANT CHANGE UP (ref 1.6–2.6)
MCHC RBC-ENTMCNC: 27.8 PG — SIGNIFICANT CHANGE UP (ref 27–34)
MCHC RBC-ENTMCNC: 32 GM/DL — SIGNIFICANT CHANGE UP (ref 32–36)
MCV RBC AUTO: 86.8 FL — SIGNIFICANT CHANGE UP (ref 80–100)
NRBC # BLD: 0 /100 WBCS — SIGNIFICANT CHANGE UP
NRBC # FLD: 0 K/UL — SIGNIFICANT CHANGE UP
PHOSPHATE SERPL-MCNC: 2.8 MG/DL — SIGNIFICANT CHANGE UP (ref 2.5–4.5)
PLATELET # BLD AUTO: 252 K/UL — SIGNIFICANT CHANGE UP (ref 150–400)
POTASSIUM SERPL-MCNC: 4.4 MMOL/L — SIGNIFICANT CHANGE UP (ref 3.5–5.3)
POTASSIUM SERPL-SCNC: 4.4 MMOL/L — SIGNIFICANT CHANGE UP (ref 3.5–5.3)
PROCALCITONIN SERPL-MCNC: 0.04 NG/ML — SIGNIFICANT CHANGE UP (ref 0.02–0.1)
RBC # BLD: 4.32 M/UL — SIGNIFICANT CHANGE UP (ref 3.8–5.2)
RBC # FLD: 14.2 % — SIGNIFICANT CHANGE UP (ref 10.3–14.5)
SODIUM SERPL-SCNC: 137 MMOL/L — SIGNIFICANT CHANGE UP (ref 135–145)
WBC # BLD: 6.41 K/UL — SIGNIFICANT CHANGE UP (ref 3.8–10.5)
WBC # FLD AUTO: 6.41 K/UL — SIGNIFICANT CHANGE UP (ref 3.8–10.5)

## 2020-12-28 PROCEDURE — 99233 SBSQ HOSP IP/OBS HIGH 50: CPT

## 2020-12-28 PROCEDURE — 93306 TTE W/DOPPLER COMPLETE: CPT | Mod: 26

## 2020-12-28 RX ADMIN — MIDODRINE HYDROCHLORIDE 5 MILLIGRAM(S): 2.5 TABLET ORAL at 11:58

## 2020-12-28 RX ADMIN — Medication 3 MILLIGRAM(S): at 21:16

## 2020-12-28 RX ADMIN — SENNA PLUS 2 TABLET(S): 8.6 TABLET ORAL at 21:18

## 2020-12-28 RX ADMIN — MIDODRINE HYDROCHLORIDE 5 MILLIGRAM(S): 2.5 TABLET ORAL at 17:37

## 2020-12-28 RX ADMIN — ENOXAPARIN SODIUM 40 MILLIGRAM(S): 100 INJECTION SUBCUTANEOUS at 05:13

## 2020-12-28 RX ADMIN — ENOXAPARIN SODIUM 40 MILLIGRAM(S): 100 INJECTION SUBCUTANEOUS at 17:38

## 2020-12-28 RX ADMIN — Medication 650 MILLIGRAM(S): at 23:47

## 2020-12-28 RX ADMIN — Medication 650 MILLIGRAM(S): at 00:17

## 2020-12-28 RX ADMIN — MIDODRINE HYDROCHLORIDE 5 MILLIGRAM(S): 2.5 TABLET ORAL at 05:14

## 2020-12-28 NOTE — PROGRESS NOTE ADULT - PROBLEM SELECTOR PLAN 4
- Blood cultures: 1 out of 2 sets noted to have Staph hominis. Suspect contaminant.   - Pt remains hemodynamically stable and with normal WBC.   - Repeat blood cx NGTD. Defer further abx for now, ID f/u appreciated

## 2020-12-28 NOTE — PROGRESS NOTE ADULT - SUBJECTIVE AND OBJECTIVE BOX
Patient is a 60y old  Female who presents with a chief complaint of SOB due to COVID PNA (27 Dec 2020 15:29)      SUBJECTIVE / OVERNIGHT EVENTS:    MEDICATIONS  (STANDING):  enoxaparin Injectable 40 milliGRAM(s) SubCutaneous every 12 hours  midodrine. 5 milliGRAM(s) Oral three times a day  polyethylene glycol 3350 17 Gram(s) Oral daily  senna 2 Tablet(s) Oral at bedtime    MEDICATIONS  (PRN):  acetaminophen   Tablet .. 650 milliGRAM(s) Oral every 6 hours PRN Temp greater or equal to 38C (100.4F), Mild Pain (1 - 3), Moderate Pain (4 - 6)  ALBUTerol    90 MICROgram(s) HFA Inhaler 2 Puff(s) Inhalation every 6 hours PRN Shortness of Breath and/or Wheezing  aluminum hydroxide/magnesium hydroxide/simethicone Suspension 30 milliLiter(s) Oral every 4 hours PRN Dyspepsia  bisacodyl Suppository 10 milliGRAM(s) Rectal daily PRN Constipation  guaifenesin/dextromethorphan  Syrup 10 milliLiter(s) Oral every 4 hours PRN Cough  meclizine 25 milliGRAM(s) Oral three times a day PRN Dizziness  melatonin 3 milliGRAM(s) Oral at bedtime PRN Insomnia  ondansetron Injectable 4 milliGRAM(s) IV Push every 4 hours PRN Nausea and/or Vomiting      Vital Signs Last 24 Hrs  T(C): 37.1 (28 Dec 2020 09:18), Max: 37.1 (27 Dec 2020 20:46)  T(F): 98.7 (28 Dec 2020 09:18), Max: 98.8 (27 Dec 2020 20:46)  HR: 69 (28 Dec 2020 11:56) (60 - 85)  BP: 100/56 (28 Dec 2020 11:56) (98/58 - 119/59)  BP(mean): --  RR: 18 (28 Dec 2020 09:18) (17 - 18)  SpO2: 97% (28 Dec 2020 09:18) (97% - 100%)  CAPILLARY BLOOD GLUCOSE        I&O's Summary      PHYSICAL EXAM:  GENERAL: NAD, well-developed  HEAD:  Atraumatic, Normocephalic  EYES: EOMI, PERRLA, conjunctiva and sclera clear  NECK: Supple, No JVD  CHEST/LUNG: Clear to auscultation bilaterally; No wheeze  HEART: Regular rate and rhythm; No murmurs, rubs, or gallops  ABDOMEN: Soft, Nontender, Nondistended; Bowel sounds present  EXTREMITIES:  2+ Peripheral Pulses, No clubbing, cyanosis, or edema  PSYCH: AAOx3  NEUROLOGY: non-focal  SKIN: No rashes or lesions    LABS:                        12.0   6.41  )-----------( 252      ( 28 Dec 2020 08:10 )             37.5     12-28    137  |  102  |  17  ----------------------------<  101<H>  4.4   |  25  |  0.92    Ca    8.9      28 Dec 2020 08:15  Phos  2.8     12-28  Mg     2.6     12-28                RADIOLOGY & ADDITIONAL TESTS:    Imaging Personally Reviewed:    Consultant(s) Notes Reviewed:      Care Discussed with Consultants/Other Providers:   Patient is a 60y old  Female who presents with a chief complaint of SOB due to COVID PNA (27 Dec 2020 15:29)      SUBJECTIVE / OVERNIGHT EVENTS:  Patient still feels dizzy with HA on ambulation. Patient has no new complaints. Denies cp, SOB, abdominal pain, N/V/D     MEDICATIONS  (STANDING):  enoxaparin Injectable 40 milliGRAM(s) SubCutaneous every 12 hours  midodrine. 5 milliGRAM(s) Oral three times a day  polyethylene glycol 3350 17 Gram(s) Oral daily  senna 2 Tablet(s) Oral at bedtime    MEDICATIONS  (PRN):  acetaminophen   Tablet .. 650 milliGRAM(s) Oral every 6 hours PRN Temp greater or equal to 38C (100.4F), Mild Pain (1 - 3), Moderate Pain (4 - 6)  ALBUTerol    90 MICROgram(s) HFA Inhaler 2 Puff(s) Inhalation every 6 hours PRN Shortness of Breath and/or Wheezing  aluminum hydroxide/magnesium hydroxide/simethicone Suspension 30 milliLiter(s) Oral every 4 hours PRN Dyspepsia  bisacodyl Suppository 10 milliGRAM(s) Rectal daily PRN Constipation  guaifenesin/dextromethorphan  Syrup 10 milliLiter(s) Oral every 4 hours PRN Cough  meclizine 25 milliGRAM(s) Oral three times a day PRN Dizziness  melatonin 3 milliGRAM(s) Oral at bedtime PRN Insomnia  ondansetron Injectable 4 milliGRAM(s) IV Push every 4 hours PRN Nausea and/or Vomiting      Vital Signs Last 24 Hrs  T(C): 37.1 (28 Dec 2020 09:18), Max: 37.1 (27 Dec 2020 20:46)  T(F): 98.7 (28 Dec 2020 09:18), Max: 98.8 (27 Dec 2020 20:46)  HR: 69 (28 Dec 2020 11:56) (60 - 85)  BP: 100/56 (28 Dec 2020 11:56) (98/58 - 119/59)  BP(mean): --  RR: 18 (28 Dec 2020 09:18) (17 - 18)  SpO2: 97% (28 Dec 2020 09:18) (97% - 100%)  CAPILLARY BLOOD GLUCOSE        I&O's Summary      PHYSICAL EXAM:  GENERAL: NAD, well-developed  HEAD:  Atraumatic, Normocephalic  EYES: EOMI, PERRLA, conjunctiva and sclera clear  NECK: Supple, No JVD  CHEST/LUNG: Clear to auscultation bilaterally; No wheeze  HEART: Regular rate and rhythm; No murmurs, rubs, or gallops  ABDOMEN: Soft, Nontender, Nondistended; Bowel sounds present  EXTREMITIES:  2+ Peripheral Pulses, No clubbing, cyanosis, or edema  PSYCH: AAOx3  NEUROLOGY: non-focal  SKIN: No rashes or lesions    LABS:                        12.0   6.41  )-----------( 252      ( 28 Dec 2020 08:10 )             37.5     12-28    137  |  102  |  17  ----------------------------<  101<H>  4.4   |  25  |  0.92    Ca    8.9      28 Dec 2020 08:15  Phos  2.8     12-28  Mg     2.6     12-28                RADIOLOGY & ADDITIONAL TESTS:    Imaging Personally Reviewed:    Consultant(s) Notes Reviewed:      Care Discussed with Consultants/Other Providers:

## 2020-12-28 NOTE — PROGRESS NOTE ADULT - PROBLEM SELECTOR PLAN 2
- 12/25: Reports 1 episode of chest pain overnight, midsternal, nonradiating.   - Currently denies any chest pain or shortness of breath, nausea/vomiting.   - Admits to feeling intermittently anxious/overwhelmed due to Covid.   - RULED OUT ACS, symptoms possibly anxiety driven.   - 12/25: TROPONIN negative, EKG with no acute findings, no ST changes.   - Monitor for now.

## 2020-12-28 NOTE — PROGRESS NOTE ADULT - ASSESSMENT
61 yo woman w/ Hx obesity, HLD admitted for monitoring of respiratory status in the setting of COVID PNA. Pt symptomatic but remains with adequate oxygenation on room air. Noted positive blood cx in 1 of 2 sets s/p dose of Vancomycin but likely contaminant since noted to be coag negative staph.

## 2020-12-28 NOTE — PROGRESS NOTE ADULT - PROBLEM SELECTOR PLAN 1
- Symptomatic: reports continued intermittent dizziness, headache. No SOB/CP.   - Possible dysautonomia in the setting of COVID infection?  - s/p bolus 250 cc x2, judicious use of fluids.   - on Midodrine 5mg TID. HOLD for SBP >120.   - Meclizine PRN dizziness.   - Check daily orthostatics.   -check TTE - Symptomatic: reports continued intermittent dizziness, headache. No SOB/CP.   - BPs still low despite fluids and adequate PO hydration  - on Midodrine 5mg TID.   - Meclizine PRN dizziness.   - Check orthostatics today.   -check TTE to r/o valvular heart disease and to check EF; spoke to cardiology fellow.

## 2020-12-28 NOTE — PROGRESS NOTE ADULT - PROBLEM SELECTOR PLAN 3
- COVID PCR + on 12/7 (as OP), 12/11 ([facility]). CXR w hazy opacities. Symptomatic.   - Pt saturating 94-98% on room air. During ambulation 96%.  - RECHECK O2 saturation during ambulation.  - Completed Remdesivir day 5/5 and Dexamethasone day 9/10   - Monitor inflammatory markers, LFTs and renal function closely, trending down   - c/w supportive care with cough suppressants and Albuterol MDIs.  - PRN Tylenol for body aches and headache.  - Incentive spirometry, Chest PT as tolerated.

## 2020-12-29 VITALS
RESPIRATION RATE: 18 BRPM | DIASTOLIC BLOOD PRESSURE: 63 MMHG | OXYGEN SATURATION: 98 % | SYSTOLIC BLOOD PRESSURE: 126 MMHG | HEART RATE: 68 BPM | TEMPERATURE: 98 F

## 2020-12-29 PROCEDURE — 99239 HOSP IP/OBS DSCHRG MGMT >30: CPT

## 2020-12-29 RX ORDER — MIDODRINE HYDROCHLORIDE 2.5 MG/1
1 TABLET ORAL
Qty: 42 | Refills: 0
Start: 2020-12-29 | End: 2021-01-11

## 2020-12-29 RX ORDER — SENNA PLUS 8.6 MG/1
2 TABLET ORAL
Qty: 0 | Refills: 0 | DISCHARGE
Start: 2020-12-29

## 2020-12-29 RX ORDER — ALBUTEROL 90 UG/1
2 AEROSOL, METERED ORAL
Qty: 1 | Refills: 0
Start: 2020-12-29 | End: 2021-01-27

## 2020-12-29 RX ORDER — LANOLIN ALCOHOL/MO/W.PET/CERES
1 CREAM (GRAM) TOPICAL
Qty: 0 | Refills: 0 | DISCHARGE
Start: 2020-12-29

## 2020-12-29 RX ORDER — RIVAROXABAN 15 MG-20MG
1 KIT ORAL
Qty: 30 | Refills: 0
Start: 2020-12-29 | End: 2021-01-27

## 2020-12-29 RX ADMIN — Medication 25 MILLIGRAM(S): at 06:59

## 2020-12-29 RX ADMIN — MIDODRINE HYDROCHLORIDE 5 MILLIGRAM(S): 2.5 TABLET ORAL at 06:59

## 2020-12-29 RX ADMIN — ENOXAPARIN SODIUM 40 MILLIGRAM(S): 100 INJECTION SUBCUTANEOUS at 07:00

## 2020-12-29 NOTE — PROGRESS NOTE ADULT - PROBLEM SELECTOR PLAN 1
- Symptomatic: reports continued intermittent dizziness, headache. No SOB/CP.   - on Midodrine 5mg TID.   - Meclizine PRN dizziness.   - orthostatics improved.   -TTE WNL - Orthostatic reading improving  - on Midodrine 5mg TID.   - Meclizine PRN dizziness.   -TTE WNL - Orthostatic reading improving  -patient denies dizzines or HA today.  - on Midodrine 5mg TID x 2 weeks  - Meclizine PRN dizziness.   -TTE WNL

## 2020-12-29 NOTE — PROGRESS NOTE ADULT - PROBLEM SELECTOR PROBLEM 7
Prophylactic measure
Transaminitis
Prophylactic measure
Prophylactic measure
Transaminitis

## 2020-12-29 NOTE — PROGRESS NOTE ADULT - PROBLEM SELECTOR PROBLEM 3
COVID-19 virus infection
Hyperlipidemia, unspecified hyperlipidemia type
COVID-19 virus infection
Hyperlipidemia, unspecified hyperlipidemia type
Hypoxia
Thrombocytopenia
Hypoxia
COVID-19 virus infection
Hyperlipidemia, unspecified hyperlipidemia type
Hyperlipidemia, unspecified hyperlipidemia type
COVID-19 virus infection
Hyperlipidemia, unspecified hyperlipidemia type
COVID-19 virus infection
Hypoxia

## 2020-12-29 NOTE — PROGRESS NOTE ADULT - SUBJECTIVE AND OBJECTIVE BOX
Patient is a 60y old  Female who presents with a chief complaint of SOB due to COVID PNA (28 Dec 2020 11:56)      SUBJECTIVE / OVERNIGHT EVENTS:    MEDICATIONS  (STANDING):  enoxaparin Injectable 40 milliGRAM(s) SubCutaneous every 12 hours  midodrine. 5 milliGRAM(s) Oral three times a day  polyethylene glycol 3350 17 Gram(s) Oral daily  senna 2 Tablet(s) Oral at bedtime    MEDICATIONS  (PRN):  acetaminophen   Tablet .. 650 milliGRAM(s) Oral every 6 hours PRN Temp greater or equal to 38C (100.4F), Mild Pain (1 - 3), Moderate Pain (4 - 6)  ALBUTerol    90 MICROgram(s) HFA Inhaler 2 Puff(s) Inhalation every 6 hours PRN Shortness of Breath and/or Wheezing  aluminum hydroxide/magnesium hydroxide/simethicone Suspension 30 milliLiter(s) Oral every 4 hours PRN Dyspepsia  bisacodyl Suppository 10 milliGRAM(s) Rectal daily PRN Constipation  guaifenesin/dextromethorphan  Syrup 10 milliLiter(s) Oral every 4 hours PRN Cough  meclizine 25 milliGRAM(s) Oral three times a day PRN Dizziness  melatonin 3 milliGRAM(s) Oral at bedtime PRN Insomnia  ondansetron Injectable 4 milliGRAM(s) IV Push every 4 hours PRN Nausea and/or Vomiting      Vital Signs Last 24 Hrs  T(C): 37 (29 Dec 2020 06:56), Max: 37 (29 Dec 2020 06:56)  T(F): 98.6 (29 Dec 2020 06:56), Max: 98.6 (29 Dec 2020 06:56)  HR: 66 (29 Dec 2020 06:56) (66 - 77)  BP: 112/69 (29 Dec 2020 06:56) (100/56 - 114/64)  BP(mean): --  RR: 19 (29 Dec 2020 06:56) (18 - 19)  SpO2: 97% (29 Dec 2020 06:56) (96% - 99%)  CAPILLARY BLOOD GLUCOSE        I&O's Summary      PHYSICAL EXAM:  GENERAL: NAD, well-developed  HEAD:  Atraumatic, Normocephalic  EYES: EOMI, PERRLA, conjunctiva and sclera clear  NECK: Supple, No JVD  CHEST/LUNG: Clear to auscultation bilaterally; No wheeze  HEART: Regular rate and rhythm; No murmurs, rubs, or gallops  ABDOMEN: Soft, Nontender, Nondistended; Bowel sounds present  EXTREMITIES:  2+ Peripheral Pulses, No clubbing, cyanosis, or edema  PSYCH: AAOx3  NEUROLOGY: non-focal  SKIN: No rashes or lesions    LABS:                        12.0   6.41  )-----------( 252      ( 28 Dec 2020 08:10 )             37.5     12-28    137  |  102  |  17  ----------------------------<  101<H>  4.4   |  25  |  0.92    Ca    8.9      28 Dec 2020 08:15  Phos  2.8     12-28  Mg     2.6     12-28                RADIOLOGY & ADDITIONAL TESTS:    Imaging Personally Reviewed: < from: Transthoracic Echocardiogram (12.28.20 @ 19:43) >  CONCLUSIONS:  1. Increased relative wall thickness with normal left  ventricular mass index, consistent with concentric left  ventricular remodeling.  2. Hyperdynamic left ventricle.  3. Normal right ventricular size and function.  4. Estimated right ventricular systolic pressure equals 39  mm Hg, assuming right atrial pressure equals 10 mm Hg,  consistent with borderline pulmonary hypertension.    < end of copied text >      Consultant(s) Notes Reviewed:      Care Discussed with Consultants/Other Providers:   Patient is a 60y old  Female who presents with a chief complaint of SOB due to COVID PNA (28 Dec 2020 11:56)      SUBJECTIVE / OVERNIGHT EVENTS:  Patient has no new complaints. Denies cp, SOB, abdominal pain, N/V/D     MEDICATIONS  (STANDING):  enoxaparin Injectable 40 milliGRAM(s) SubCutaneous every 12 hours  midodrine. 5 milliGRAM(s) Oral three times a day  polyethylene glycol 3350 17 Gram(s) Oral daily  senna 2 Tablet(s) Oral at bedtime    MEDICATIONS  (PRN):  acetaminophen   Tablet .. 650 milliGRAM(s) Oral every 6 hours PRN Temp greater or equal to 38C (100.4F), Mild Pain (1 - 3), Moderate Pain (4 - 6)  ALBUTerol    90 MICROgram(s) HFA Inhaler 2 Puff(s) Inhalation every 6 hours PRN Shortness of Breath and/or Wheezing  aluminum hydroxide/magnesium hydroxide/simethicone Suspension 30 milliLiter(s) Oral every 4 hours PRN Dyspepsia  bisacodyl Suppository 10 milliGRAM(s) Rectal daily PRN Constipation  guaifenesin/dextromethorphan  Syrup 10 milliLiter(s) Oral every 4 hours PRN Cough  meclizine 25 milliGRAM(s) Oral three times a day PRN Dizziness  melatonin 3 milliGRAM(s) Oral at bedtime PRN Insomnia  ondansetron Injectable 4 milliGRAM(s) IV Push every 4 hours PRN Nausea and/or Vomiting      Vital Signs Last 24 Hrs  T(C): 37 (29 Dec 2020 06:56), Max: 37 (29 Dec 2020 06:56)  T(F): 98.6 (29 Dec 2020 06:56), Max: 98.6 (29 Dec 2020 06:56)  HR: 66 (29 Dec 2020 06:56) (66 - 77)  BP: 112/69 (29 Dec 2020 06:56) (100/56 - 114/64)  BP(mean): --  RR: 19 (29 Dec 2020 06:56) (18 - 19)  SpO2: 97% (29 Dec 2020 06:56) (96% - 99%)  CAPILLARY BLOOD GLUCOSE        I&O's Summary      PHYSICAL EXAM:  GENERAL: NAD, well-developed  HEAD:  Atraumatic, Normocephalic  EYES: EOMI, PERRLA, conjunctiva and sclera clear  NECK: Supple, No JVD  CHEST/LUNG: Clear to auscultation bilaterally; No wheeze  HEART: Regular rate and rhythm; No murmurs, rubs, or gallops  ABDOMEN: Soft, Nontender, Nondistended; Bowel sounds present  EXTREMITIES:  2+ Peripheral Pulses, No clubbing, cyanosis, or edema  PSYCH: AAOx3  NEUROLOGY: non-focal  SKIN: No rashes or lesions    LABS:                        12.0   6.41  )-----------( 252      ( 28 Dec 2020 08:10 )             37.5     12-28    137  |  102  |  17  ----------------------------<  101<H>  4.4   |  25  |  0.92    Ca    8.9      28 Dec 2020 08:15  Phos  2.8     12-28  Mg     2.6     12-28                RADIOLOGY & ADDITIONAL TESTS:    Imaging Personally Reviewed: < from: Transthoracic Echocardiogram (12.28.20 @ 19:43) >  CONCLUSIONS:  1. Increased relative wall thickness with normal left  ventricular mass index, consistent with concentric left  ventricular remodeling.  2. Hyperdynamic left ventricle.  3. Normal right ventricular size and function.  4. Estimated right ventricular systolic pressure equals 39  mm Hg, assuming right atrial pressure equals 10 mm Hg,  consistent with borderline pulmonary hypertension.    < end of copied text >      Consultant(s) Notes Reviewed:      Care Discussed with Consultants/Other Providers:

## 2020-12-29 NOTE — PROGRESS NOTE ADULT - PROBLEM SELECTOR PLAN 7
- IMPROVE Score 3 - on Lovenox SQ for DVT prevention.   - D-dimer was worsening therefore increased Lovenox 40mg SQ to BID  - Will need extended DVT ppx on DC ASA 81mg or Xarelto 10mg ( x30 days).   - Constipation: bowel regimen was added s/p suppository PRN.   - Dispo PENDING COURSE, Weaned to room air, O2 sats stable during ambulation.   - Updated  Rodriguez on 12/24 on current condition/treatment plan. - IMPROVE Score 3 - on Lovenox SQ for DVT prevention.   - D-dimer was worsening therefore increased Lovenox 40mg SQ to BID  - Will need extended DVT ppx on DC ASA 81mg  ( x30 days).   - Dispo PENDING COURSE, Weaned to room air, O2 sats stable during ambulation. - IMPROVE Score 3 - on Lovenox SQ for DVT prevention.   - D-dimer was worsening therefore increased Lovenox 40mg SQ to BID  - Will need extended DVT ppx on DC ASA 81mg  ( x30 days).   - Dispo PENDING COURSE, Weaned to room air, O2 sats stable during ambulation.  spoke to  Lokesh Orourke and aware of discharge planning. - IMPROVE Score 3 - on Lovenox SQ for DVT prevention.   - D-dimer was worsening therefore increased Lovenox 40mg SQ to BID  - Will need extended DVT ppx on DC xarelto  ( x30 days).   - Dispo PENDING COURSE, Weaned to room air, O2 sats stable during ambulation.  spoke to  Lokesh Orourke and aware of discharge planning.

## 2020-12-29 NOTE — PROGRESS NOTE ADULT - PROVIDER SPECIALTY LIST ADULT
Hospitalist
Infectious Disease
Hospitalist
Infectious Disease

## 2020-12-29 NOTE — PROGRESS NOTE ADULT - PROBLEM SELECTOR PLAN 3
- COVID PCR + on 12/7 (as OP), 12/11 ([facility]). CXR w hazy opacities. Symptomatic.   - Pt saturating 94-98% on room air. During ambulation 96%.  - RECHECK O2 saturation during ambulation.  - Completed Remdesivir day 5/5 and Dexamethasone day 9/10   - Monitor inflammatory markers, LFTs and renal function closely, trending down   - c/w supportive care with cough suppressants and Albuterol MDIs.  - PRN Tylenol for body aches and headache.  - Incentive spirometry, Chest PT as tolerated. - COVID PCR + on 12/7 (as OP), 12/11 ([facility]). CXR w hazy opacities.  - Pt saturating 94-98% on room air. During ambulation 96%.  - Completed Remdesivir day 5/5 and Dexamethasone day 9/10   - Monitor inflammatory markers, LFTs and renal function closely, trending down   - c/w supportive care with cough suppressants and Albuterol MDIs.  - PRN Tylenol for body aches and headache.  - Incentive spirometry, Chest PT as tolerated.

## 2020-12-29 NOTE — PROGRESS NOTE ADULT - PROBLEM SELECTOR PROBLEM 4
Positive blood culture
Positive blood culture
Thrombocytopenia
Thrombocytopenia
Positive blood culture
Thrombocytopenia
Positive blood culture
Prophylactic measure
Thrombocytopenia
Positive blood culture
Thrombocytopenia
Medication monitoring encounter

## 2020-12-29 NOTE — PROGRESS NOTE ADULT - PROBLEM SELECTOR PROBLEM 6
Thrombocytopenia
Fever

## 2020-12-29 NOTE — PROGRESS NOTE ADULT - PROBLEM SELECTOR PROBLEM 1
Positive blood culture
Positive blood culture
Orthostatic hypotension
Positive blood culture
COVID-19 virus infection
Positive blood culture
Orthostatic hypotension
Positive blood culture
Orthostatic hypotension
Positive blood culture
Chest pain
Positive blood culture
Orthostatic hypotension
Positive blood culture
Pneumonia due to COVID-19 virus

## 2020-12-29 NOTE — PROGRESS NOTE ADULT - REASON FOR ADMISSION
SOB due to COVID PNA

## 2020-12-29 NOTE — PROGRESS NOTE ADULT - PROBLEM SELECTOR PROBLEM 5
Prophylactic measure
Cough
Hyperlipidemia, unspecified hyperlipidemia type
Prophylactic measure
Cough
Prophylactic measure
Hyperlipidemia, unspecified hyperlipidemia type
Prophylactic measure
Hyperlipidemia, unspecified hyperlipidemia type
Prophylactic measure
Prophylactic measure
Hyperlipidemia, unspecified hyperlipidemia type
Prophylactic measure
Hyperlipidemia, unspecified hyperlipidemia type
Cough

## 2020-12-29 NOTE — PROGRESS NOTE ADULT - PROBLEM SELECTOR PLAN 2
- 12/25: Reports 1 episode of chest pain overnight, midsternal, nonradiating.   - Currently denies any chest pain or shortness of breath, nausea/vomiting.   - Admits to feeling intermittently anxious/overwhelmed due to Covid.   - RULED OUT ACS, symptoms possibly anxiety driven.   - 12/25: TROPONIN negative, EKG with no acute findings, no ST changes.   - Monitor for now. - 12/25: Reports 1 episode of chest pain overnight, midsternal, nonradiating.   - Currently denies any chest pain or shortness of breath, nausea/vomiting.   - Admits to feeling intermittently anxious/overwhelmed due to Covid.   - RULED OUT ACS, symptoms possibly anxiety driven.   - 12/25: TROPONIN negative, EKG with no acute findings, no ST changes.   - TTE WNL

## 2020-12-29 NOTE — PROGRESS NOTE ADULT - ASSESSMENT
61 yo woman w/ Hx obesity, HLD admitted for monitoring of respiratory status in the setting of COVID PNA. Pt symptomatic but remains with adequate oxygenation on room air. Noted positive blood cx in 1 of 2 sets s/p dose of Vancomycin but likely contaminant since noted to be coag negative staph. 59 yo woman w/ Hx obesity, HLD admitted for monitoring of respiratory status in the setting of COVID PNA. Pt symptomatic but remains with adequate oxygenation on room air. Noted positive blood cx in 1 of 2 sets s/p dose of Vancomycin but likely contaminant since noted to be coag negative staph. D/C 40 minutes.

## 2020-12-29 NOTE — DISCHARGE NOTE NURSING/CASE MANAGEMENT/SOCIAL WORK - PATIENT PORTAL LINK FT
You can access the FollowMyHealth Patient Portal offered by [facility] by registering at the following website: [email or web address]. By joining [facility]’s FollowMyHealth portal, you will also be able to view your health information using other applications (apps) compatible with our system.

## 2020-12-29 NOTE — PROGRESS NOTE ADULT - PROBLEM SELECTOR PROBLEM 2
COVID-19 virus infection
Hyperlipidemia, unspecified hyperlipidemia type
COVID-19 virus infection
COVID-19 virus infection
Chest pain
Orthostatic hypotension
Chest pain
Chest pain
COVID-19 virus infection
Chest pain
Positive blood culture

## 2022-01-01 NOTE — PROGRESS NOTE ADULT - ATTENDING COMMENTS
Patent Shade Howell  Attending Physician   Division of Infectious Disease  Pager #[phone]  After 5pm/weekend or no response, call #[phone]    Will sign off, recall ID if needed #[phone].

## 2023-05-03 NOTE — PATIENT PROFILE ADULT - TRANSPORTATION
Patient c/o worsening headache which began at 3pm on left side of head and has now become generalized with pressure sensation. Patient reports PMH of HTN. no

## 2025-03-04 NOTE — PROGRESS NOTE ADULT - CARDIOVASCULAR
Nutrition Assessment   Reason for consult/assessment: Reassessment    Diagnosis, Labs, Medication, History: Reviewed    Oral diet order: IDDSI 6 Soft & Bite-Sized (Dysphagia)    Nutrition supplement/snacks: Vanilla Ensure Plus HP once daily, PB fudge Power Crunch bar once daily - patient reports he continues to drink the Ensure, not eating the Power Crunch bars since his edentulation    Diet tolerance: Tolerating with good appetite/intakes recorded (% meal intakes. Patient reports a good appetite and states he is tolerating TID meals. Notes his mouth is still sore since his edentulation and has been eating soft foods. +BM x2 3/2)    Food allergies: None known    Anthropometrics Information  Wt Readings from Last 1 Encounters:   03/04/25 57 kg     Treatment Plan/Interventions   Meals & snacks: IDDSI 6 soft & bite-sized diet. Encouraged TID meal intakes with emphasis on protein dense foods.    Nutrition supplement therapy: Discontinued Power Crunch bar per patient request. Increased Ensure Plus HP to BID per patient agreement.    Nutrition education: Discharge planning: Pre transplant diet education was completed on 1/14/25. Noted status change to LVAD DT. Discussed low sodium, fluid restricted diet with patient 2/25/25, Polish handouts provided. Reviewed diet education with patient's daughter on 3/4/25. Will continue to reinforce as needed prior to discharge.          Goals & Monitoring  Intervention: Meals and snacks, Nutrition supplement therapy, Nutrition education    Goal: Increase oral intake to >/equal 75% of meals and supplements, Maintain or improve weight  Intervention goal status: Some progress toward goal  Time frame to achieve goal: Ongoing    Dietitian will monitor: Anthropometric Measurements, Food, beverage, and nutrient intake          Nutrition Diagnosis/PES   Nutrition Diagnosis: Increased nutrient needs     Related to: Increased nutrition demands s/p surgery  As evidenced by: Calculated  detailed exam needs     Primary nutrition diagnosis status: Active nutrition diagnosis               details…